# Patient Record
Sex: FEMALE | Race: ASIAN | NOT HISPANIC OR LATINO | URBAN - METROPOLITAN AREA
[De-identification: names, ages, dates, MRNs, and addresses within clinical notes are randomized per-mention and may not be internally consistent; named-entity substitution may affect disease eponyms.]

---

## 2016-10-19 RX ORDER — IPRATROPIUM/ALBUTEROL SULFATE 18-103MCG
3 AEROSOL WITH ADAPTER (GRAM) INHALATION
Qty: 0 | Refills: 0 | COMMUNITY
Start: 2016-10-19

## 2017-01-04 ENCOUNTER — INPATIENT (INPATIENT)
Facility: HOSPITAL | Age: 82
LOS: 4 days | Discharge: NURSING, REHAB OR LONG-TERM CARE | DRG: 208 | End: 2017-01-09
Attending: INTERNAL MEDICINE | Admitting: INTERNAL MEDICINE
Payer: MEDICARE

## 2017-01-04 VITALS — OXYGEN SATURATION: 98 % | HEART RATE: 58 BPM

## 2017-01-04 DIAGNOSIS — J96.00 ACUTE RESPIRATORY FAILURE, UNSPECIFIED WHETHER WITH HYPOXIA OR HYPERCAPNIA: ICD-10-CM

## 2017-01-04 DIAGNOSIS — Z95.2 PRESENCE OF PROSTHETIC HEART VALVE: Chronic | ICD-10-CM

## 2017-01-04 DIAGNOSIS — Z93.0 TRACHEOSTOMY STATUS: Chronic | ICD-10-CM

## 2017-01-04 DIAGNOSIS — Z96.641 PRESENCE OF RIGHT ARTIFICIAL HIP JOINT: Chronic | ICD-10-CM

## 2017-01-04 DIAGNOSIS — Z98.89 OTHER SPECIFIED POSTPROCEDURAL STATES: Chronic | ICD-10-CM

## 2017-01-04 LAB
ALBUMIN SERPL ELPH-MCNC: 3.1 G/DL — LOW (ref 3.3–5)
ALP SERPL-CCNC: 217 U/L — HIGH (ref 30–120)
ALT FLD-CCNC: 36 U/L DA — SIGNIFICANT CHANGE UP (ref 10–60)
ANION GAP SERPL CALC-SCNC: 10 MMOL/L — SIGNIFICANT CHANGE UP (ref 5–17)
APTT BLD: 31.9 SEC — SIGNIFICANT CHANGE UP (ref 27.5–37.4)
AST SERPL-CCNC: 58 U/L — HIGH (ref 10–40)
BASE EXCESS BLDA CALC-SCNC: 3.2 MMOL/L — HIGH (ref -2–2)
BASOPHILS # BLD AUTO: 0.1 K/UL — SIGNIFICANT CHANGE UP (ref 0–0.2)
BASOPHILS NFR BLD AUTO: 0.6 % — SIGNIFICANT CHANGE UP (ref 0–2)
BILIRUB SERPL-MCNC: 0.3 MG/DL — SIGNIFICANT CHANGE UP (ref 0.2–1.2)
BLOOD GAS SOURCE: SIGNIFICANT CHANGE UP
BUN SERPL-MCNC: 66 MG/DL — HIGH (ref 7–23)
CALCIUM SERPL-MCNC: 9 MG/DL — SIGNIFICANT CHANGE UP (ref 8.4–10.5)
CHLORIDE SERPL-SCNC: 104 MMOL/L — SIGNIFICANT CHANGE UP (ref 96–108)
CK MB BLD-MCNC: 4.4 % — HIGH (ref 0–3.5)
CK MB CFR SERPL CALC: 4.9 NG/ML — HIGH (ref 0–3.6)
CK SERPL-CCNC: 111 U/L — SIGNIFICANT CHANGE UP (ref 26–192)
CO2 SERPL-SCNC: 28 MMOL/L — SIGNIFICANT CHANGE UP (ref 22–31)
CREAT SERPL-MCNC: 1.94 MG/DL — HIGH (ref 0.5–1.3)
EOSINOPHIL # BLD AUTO: 0 K/UL — SIGNIFICANT CHANGE UP (ref 0–0.5)
EOSINOPHIL NFR BLD AUTO: 0 % — SIGNIFICANT CHANGE UP (ref 0–6)
GLUCOSE SERPL-MCNC: 182 MG/DL — HIGH (ref 70–99)
HCO3 BLDA-SCNC: 28 MMOL/L — SIGNIFICANT CHANGE UP (ref 21–29)
HCT VFR BLD CALC: 34.2 % — LOW (ref 34.5–45)
HGB BLD-MCNC: 10 G/DL — LOW (ref 11.5–15.5)
HOROWITZ INDEX BLDA+IHG-RTO: 60 — SIGNIFICANT CHANGE UP
INR BLD: 2.56 RATIO — HIGH (ref 0.88–1.16)
LYMPHOCYTES # BLD AUTO: 17 % — SIGNIFICANT CHANGE UP (ref 13–44)
LYMPHOCYTES # BLD AUTO: 2.2 K/UL — SIGNIFICANT CHANGE UP (ref 1–3.3)
MCHC RBC-ENTMCNC: 28.1 PG — SIGNIFICANT CHANGE UP (ref 27–34)
MCHC RBC-ENTMCNC: 29.3 GM/DL — LOW (ref 32–36)
MCV RBC AUTO: 95.9 FL — SIGNIFICANT CHANGE UP (ref 80–100)
MONOCYTES # BLD AUTO: 0.5 K/UL — SIGNIFICANT CHANGE UP (ref 0–0.9)
MONOCYTES NFR BLD AUTO: 3.7 % — SIGNIFICANT CHANGE UP (ref 2–14)
NEUTROPHILS # BLD AUTO: 10.2 K/UL — HIGH (ref 1.8–7.4)
NEUTROPHILS NFR BLD AUTO: 78.7 % — HIGH (ref 43–77)
PCO2 BLDA: 32 MMHG — SIGNIFICANT CHANGE UP (ref 32–46)
PH BLD: 7.52 — HIGH (ref 7.35–7.45)
PLATELET # BLD AUTO: 228 K/UL — SIGNIFICANT CHANGE UP (ref 150–400)
PO2 BLDA: 166 MMHG — HIGH (ref 74–108)
POTASSIUM SERPL-MCNC: 5.4 MMOL/L — HIGH (ref 3.5–5.3)
POTASSIUM SERPL-SCNC: 5.4 MMOL/L — HIGH (ref 3.5–5.3)
PROT SERPL-MCNC: 7.4 G/DL — SIGNIFICANT CHANGE UP (ref 6–8.3)
PROTHROM AB SERPL-ACNC: 29 SEC — HIGH (ref 10–13.1)
RBC # BLD: 3.57 M/UL — LOW (ref 3.8–5.2)
RBC # FLD: 16.2 % — HIGH (ref 10.3–14.5)
SAO2 % BLDA: 100 % — HIGH (ref 92–96)
SODIUM SERPL-SCNC: 142 MMOL/L — SIGNIFICANT CHANGE UP (ref 135–145)
TROPONIN I SERPL-MCNC: 0.11 NG/ML — HIGH (ref 0.02–0.06)
WBC # BLD: 13 K/UL — HIGH (ref 3.8–10.5)
WBC # FLD AUTO: 13 K/UL — HIGH (ref 3.8–10.5)

## 2017-01-04 PROCEDURE — 71010: CPT | Mod: 26

## 2017-01-04 PROCEDURE — 70450 CT HEAD/BRAIN W/O DYE: CPT | Mod: 26

## 2017-01-04 PROCEDURE — 99285 EMERGENCY DEPT VISIT HI MDM: CPT

## 2017-01-04 PROCEDURE — 93010 ELECTROCARDIOGRAM REPORT: CPT

## 2017-01-04 RX ORDER — TIOTROPIUM BROMIDE 18 UG/1
1 CAPSULE ORAL; RESPIRATORY (INHALATION) DAILY
Qty: 0 | Refills: 0 | Status: DISCONTINUED | OUTPATIENT
Start: 2017-01-04 | End: 2017-01-09

## 2017-01-04 RX ORDER — PANTOPRAZOLE SODIUM 20 MG/1
40 TABLET, DELAYED RELEASE ORAL DAILY
Qty: 0 | Refills: 0 | Status: DISCONTINUED | OUTPATIENT
Start: 2017-01-04 | End: 2017-01-07

## 2017-01-04 RX ORDER — POTASSIUM CHLORIDE 20 MEQ
10 PACKET (EA) ORAL
Qty: 0 | Refills: 0 | Status: DISCONTINUED | OUTPATIENT
Start: 2017-01-04 | End: 2017-01-09

## 2017-01-04 RX ORDER — ALBUTEROL 90 UG/1
2 AEROSOL, METERED ORAL EVERY 6 HOURS
Qty: 0 | Refills: 0 | Status: DISCONTINUED | OUTPATIENT
Start: 2017-01-04 | End: 2017-01-09

## 2017-01-04 RX ORDER — LEVOTHYROXINE SODIUM 125 MCG
25 TABLET ORAL DAILY
Qty: 0 | Refills: 0 | Status: DISCONTINUED | OUTPATIENT
Start: 2017-01-04 | End: 2017-01-09

## 2017-01-04 RX ORDER — DIGOXIN 250 MCG
0.12 TABLET ORAL DAILY
Qty: 0 | Refills: 0 | Status: DISCONTINUED | OUTPATIENT
Start: 2017-01-04 | End: 2017-01-09

## 2017-01-04 RX ORDER — SODIUM CHLORIDE 9 MG/ML
1000 INJECTION INTRAMUSCULAR; INTRAVENOUS; SUBCUTANEOUS
Qty: 0 | Refills: 0 | Status: DISCONTINUED | OUTPATIENT
Start: 2017-01-04 | End: 2017-01-05

## 2017-01-04 RX ORDER — GABAPENTIN 400 MG/1
200 CAPSULE ORAL
Qty: 0 | Refills: 0 | Status: DISCONTINUED | OUTPATIENT
Start: 2017-01-04 | End: 2017-01-09

## 2017-01-04 RX ORDER — FUROSEMIDE 40 MG
40 TABLET ORAL DAILY
Qty: 0 | Refills: 0 | Status: DISCONTINUED | OUTPATIENT
Start: 2017-01-04 | End: 2017-01-06

## 2017-01-05 DIAGNOSIS — R41.89 OTHER SYMPTOMS AND SIGNS INVOLVING COGNITIVE FUNCTIONS AND AWARENESS: ICD-10-CM

## 2017-01-05 DIAGNOSIS — J96.20 ACUTE AND CHRONIC RESPIRATORY FAILURE, UNSPECIFIED WHETHER WITH HYPOXIA OR HYPERCAPNIA: ICD-10-CM

## 2017-01-05 LAB
ANION GAP SERPL CALC-SCNC: 6 MMOL/L — SIGNIFICANT CHANGE UP (ref 5–17)
APTT BLD: 33.9 SEC — SIGNIFICANT CHANGE UP (ref 27.5–37.4)
BASE EXCESS BLDA CALC-SCNC: 5.8 MMOL/L — HIGH (ref -2–2)
BASOPHILS # BLD AUTO: 0 K/UL — SIGNIFICANT CHANGE UP (ref 0–0.2)
BASOPHILS NFR BLD AUTO: 0.3 % — SIGNIFICANT CHANGE UP (ref 0–2)
BLOOD GAS SOURCE: SIGNIFICANT CHANGE UP
BUN SERPL-MCNC: 61 MG/DL — HIGH (ref 7–23)
CALCIUM SERPL-MCNC: 8.7 MG/DL — SIGNIFICANT CHANGE UP (ref 8.4–10.5)
CHLORIDE SERPL-SCNC: 108 MMOL/L — SIGNIFICANT CHANGE UP (ref 96–108)
CK MB BLD-MCNC: 5.1 % — HIGH (ref 0–3.5)
CK MB CFR SERPL CALC: 3.2 NG/ML — SIGNIFICANT CHANGE UP (ref 0–3.6)
CK SERPL-CCNC: 63 U/L — SIGNIFICANT CHANGE UP (ref 26–192)
CO2 SERPL-SCNC: 30 MMOL/L — SIGNIFICANT CHANGE UP (ref 22–31)
CREAT SERPL-MCNC: 1.72 MG/DL — HIGH (ref 0.5–1.3)
EOSINOPHIL # BLD AUTO: 0 K/UL — SIGNIFICANT CHANGE UP (ref 0–0.5)
EOSINOPHIL NFR BLD AUTO: 0.3 % — SIGNIFICANT CHANGE UP (ref 0–6)
GLUCOSE SERPL-MCNC: 91 MG/DL — SIGNIFICANT CHANGE UP (ref 70–99)
HCO3 BLDA-SCNC: 30 MMOL/L — HIGH (ref 21–29)
HCT VFR BLD CALC: 27 % — LOW (ref 34.5–45)
HGB BLD-MCNC: 8.5 G/DL — LOW (ref 11.5–15.5)
HOROWITZ INDEX BLDA+IHG-RTO: 40 — SIGNIFICANT CHANGE UP
INR BLD: 3.1 RATIO — HIGH (ref 0.88–1.16)
LACTATE SERPL-SCNC: 1.6 MMOL/L — SIGNIFICANT CHANGE UP (ref 0.7–2)
LYMPHOCYTES # BLD AUTO: 0.7 K/UL — LOW (ref 1–3.3)
LYMPHOCYTES # BLD AUTO: 7.2 % — LOW (ref 13–44)
MAGNESIUM SERPL-MCNC: 2.2 MG/DL — SIGNIFICANT CHANGE UP (ref 1.6–2.6)
MCHC RBC-ENTMCNC: 28.3 PG — SIGNIFICANT CHANGE UP (ref 27–34)
MCHC RBC-ENTMCNC: 31.3 GM/DL — LOW (ref 32–36)
MCV RBC AUTO: 90.4 FL — SIGNIFICANT CHANGE UP (ref 80–100)
MONOCYTES # BLD AUTO: 0.7 K/UL — SIGNIFICANT CHANGE UP (ref 0–0.9)
MONOCYTES NFR BLD AUTO: 7.7 % — SIGNIFICANT CHANGE UP (ref 2–14)
NEUTROPHILS # BLD AUTO: 8.1 K/UL — HIGH (ref 1.8–7.4)
NEUTROPHILS NFR BLD AUTO: 84.4 % — HIGH (ref 43–77)
PCO2 BLDA: 30 MMHG — LOW (ref 32–46)
PH BLD: 7.58 — HIGH (ref 7.35–7.45)
PLATELET # BLD AUTO: 184 K/UL — SIGNIFICANT CHANGE UP (ref 150–400)
PO2 BLDA: 157 MMHG — HIGH (ref 74–108)
POTASSIUM SERPL-MCNC: 4 MMOL/L — SIGNIFICANT CHANGE UP (ref 3.5–5.3)
POTASSIUM SERPL-SCNC: 4 MMOL/L — SIGNIFICANT CHANGE UP (ref 3.5–5.3)
PROCALCITONIN SERPL-MCNC: 0.32 NG/ML — HIGH (ref 0–0.05)
PROTHROM AB SERPL-ACNC: 35.1 SEC — HIGH (ref 10–13.1)
RBC # BLD: 2.99 M/UL — LOW (ref 3.8–5.2)
RBC # FLD: 15.7 % — HIGH (ref 10.3–14.5)
SAO2 % BLDA: 100 % — HIGH (ref 92–96)
SODIUM SERPL-SCNC: 144 MMOL/L — SIGNIFICANT CHANGE UP (ref 135–145)
TROPONIN I SERPL-MCNC: 0.32 NG/ML — HIGH (ref 0.02–0.06)
WBC # BLD: 9.6 K/UL — SIGNIFICANT CHANGE UP (ref 3.8–10.5)
WBC # FLD AUTO: 9.6 K/UL — SIGNIFICANT CHANGE UP (ref 3.8–10.5)

## 2017-01-05 PROCEDURE — 93306 TTE W/DOPPLER COMPLETE: CPT | Mod: 26

## 2017-01-05 PROCEDURE — 99223 1ST HOSP IP/OBS HIGH 75: CPT | Mod: 25

## 2017-01-05 RX ORDER — ERYTHROPOIETIN 10000 [IU]/ML
10000 INJECTION, SOLUTION INTRAVENOUS; SUBCUTANEOUS
Qty: 0 | Refills: 0 | Status: DISCONTINUED | OUTPATIENT
Start: 2017-01-05 | End: 2017-01-09

## 2017-01-05 RX ORDER — VANCOMYCIN HCL 1 G
1000 VIAL (EA) INTRAVENOUS ONCE
Qty: 0 | Refills: 0 | Status: COMPLETED | OUTPATIENT
Start: 2017-01-05 | End: 2017-01-05

## 2017-01-05 RX ORDER — PIPERACILLIN AND TAZOBACTAM 4; .5 G/20ML; G/20ML
3.38 INJECTION, POWDER, LYOPHILIZED, FOR SOLUTION INTRAVENOUS ONCE
Qty: 0 | Refills: 0 | Status: COMPLETED | OUTPATIENT
Start: 2017-01-05 | End: 2017-01-05

## 2017-01-05 RX ORDER — ACETAMINOPHEN 500 MG
650 TABLET ORAL EVERY 6 HOURS
Qty: 0 | Refills: 0 | Status: DISCONTINUED | OUTPATIENT
Start: 2017-01-05 | End: 2017-01-09

## 2017-01-05 RX ORDER — SODIUM CHLORIDE 9 MG/ML
1000 INJECTION, SOLUTION INTRAVENOUS
Qty: 0 | Refills: 0 | Status: DISCONTINUED | OUTPATIENT
Start: 2017-01-05 | End: 2017-01-06

## 2017-01-05 RX ORDER — PIPERACILLIN AND TAZOBACTAM 4; .5 G/20ML; G/20ML
3.38 INJECTION, POWDER, LYOPHILIZED, FOR SOLUTION INTRAVENOUS EVERY 12 HOURS
Qty: 0 | Refills: 0 | Status: DISCONTINUED | OUTPATIENT
Start: 2017-01-05 | End: 2017-01-07

## 2017-01-05 RX ADMIN — Medication 0.12 MILLIGRAM(S): at 05:44

## 2017-01-05 RX ADMIN — GABAPENTIN 200 MILLIGRAM(S): 400 CAPSULE ORAL at 17:42

## 2017-01-05 RX ADMIN — PANTOPRAZOLE SODIUM 40 MILLIGRAM(S): 20 TABLET, DELAYED RELEASE ORAL at 12:56

## 2017-01-05 RX ADMIN — Medication 25 MICROGRAM(S): at 05:44

## 2017-01-05 RX ADMIN — Medication 10 MILLIEQUIVALENT(S): at 17:42

## 2017-01-05 RX ADMIN — GABAPENTIN 200 MILLIGRAM(S): 400 CAPSULE ORAL at 05:44

## 2017-01-05 RX ADMIN — Medication 250 MILLIGRAM(S): at 01:12

## 2017-01-05 RX ADMIN — Medication 40 MILLIGRAM(S): at 05:43

## 2017-01-05 RX ADMIN — ALBUTEROL 2 PUFF(S): 90 AEROSOL, METERED ORAL at 08:08

## 2017-01-05 RX ADMIN — Medication 650 MILLIGRAM(S): at 20:13

## 2017-01-05 RX ADMIN — SODIUM CHLORIDE 75 MILLILITER(S): 9 INJECTION INTRAMUSCULAR; INTRAVENOUS; SUBCUTANEOUS at 00:19

## 2017-01-05 RX ADMIN — Medication 650 MILLIGRAM(S): at 21:03

## 2017-01-05 RX ADMIN — PIPERACILLIN AND TAZOBACTAM 25 GRAM(S): 4; .5 INJECTION, POWDER, LYOPHILIZED, FOR SOLUTION INTRAVENOUS at 17:42

## 2017-01-05 RX ADMIN — PIPERACILLIN AND TAZOBACTAM 25 GRAM(S): 4; .5 INJECTION, POWDER, LYOPHILIZED, FOR SOLUTION INTRAVENOUS at 06:13

## 2017-01-05 RX ADMIN — SODIUM CHLORIDE 50 MILLILITER(S): 9 INJECTION, SOLUTION INTRAVENOUS at 10:26

## 2017-01-05 RX ADMIN — ALBUTEROL 2 PUFF(S): 90 AEROSOL, METERED ORAL at 00:31

## 2017-01-05 RX ADMIN — Medication 10 MILLIGRAM(S): at 05:44

## 2017-01-05 RX ADMIN — PIPERACILLIN AND TAZOBACTAM 200 GRAM(S): 4; .5 INJECTION, POWDER, LYOPHILIZED, FOR SOLUTION INTRAVENOUS at 00:41

## 2017-01-05 RX ADMIN — Medication 10 MILLIEQUIVALENT(S): at 05:43

## 2017-01-05 RX ADMIN — Medication 0.5 MILLIGRAM(S): at 00:33

## 2017-01-05 NOTE — H&P ADULT. - HISTORY OF PRESENT ILLNESS
This is an 82 YO A F transferred from SNF after she found unresponsive and her Trache cannula was out. After replacement of cannula and vent therapy patient returned to her base line.  No nausea or vomiting.

## 2017-01-06 LAB
ALBUMIN SERPL ELPH-MCNC: 2.8 G/DL — LOW (ref 3.3–5)
ALP SERPL-CCNC: 166 U/L — HIGH (ref 30–120)
ALT FLD-CCNC: 23 U/L DA — SIGNIFICANT CHANGE UP (ref 10–60)
ANION GAP SERPL CALC-SCNC: 4 MMOL/L — LOW (ref 5–17)
AST SERPL-CCNC: 30 U/L — SIGNIFICANT CHANGE UP (ref 10–40)
BILIRUB SERPL-MCNC: 0.4 MG/DL — SIGNIFICANT CHANGE UP (ref 0.2–1.2)
BUN SERPL-MCNC: 51 MG/DL — HIGH (ref 7–23)
CALCIUM SERPL-MCNC: 8.6 MG/DL — SIGNIFICANT CHANGE UP (ref 8.4–10.5)
CHLORIDE SERPL-SCNC: 107 MMOL/L — SIGNIFICANT CHANGE UP (ref 96–108)
CO2 SERPL-SCNC: 32 MMOL/L — HIGH (ref 22–31)
CREAT SERPL-MCNC: 1.81 MG/DL — HIGH (ref 0.5–1.3)
FERRITIN SERPL-MCNC: 353 NG/ML — HIGH (ref 15–150)
FOLATE SERPL-MCNC: >20 NG/ML — SIGNIFICANT CHANGE UP (ref 4.8–24.2)
GLUCOSE SERPL-MCNC: 90 MG/DL — SIGNIFICANT CHANGE UP (ref 70–99)
HCT VFR BLD CALC: 32.4 % — LOW (ref 34.5–45)
HGB BLD-MCNC: 9.7 G/DL — LOW (ref 11.5–15.5)
INR BLD: 3.01 RATIO — HIGH (ref 0.88–1.16)
IRON SATN MFR SERPL: 16 % — SIGNIFICANT CHANGE UP (ref 14–50)
IRON SATN MFR SERPL: 37 UG/DL — SIGNIFICANT CHANGE UP (ref 30–160)
MCHC RBC-ENTMCNC: 28.6 PG — SIGNIFICANT CHANGE UP (ref 27–34)
MCHC RBC-ENTMCNC: 30 GM/DL — LOW (ref 32–36)
MCV RBC AUTO: 95.6 FL — SIGNIFICANT CHANGE UP (ref 80–100)
PLATELET # BLD AUTO: 200 K/UL — SIGNIFICANT CHANGE UP (ref 150–400)
POTASSIUM SERPL-MCNC: 4.2 MMOL/L — SIGNIFICANT CHANGE UP (ref 3.5–5.3)
POTASSIUM SERPL-SCNC: 4.2 MMOL/L — SIGNIFICANT CHANGE UP (ref 3.5–5.3)
PROT SERPL-MCNC: 6.2 G/DL — SIGNIFICANT CHANGE UP (ref 6–8.3)
PROT SERPL-MCNC: 6.2 G/DL — SIGNIFICANT CHANGE UP (ref 6–8.3)
PROT SERPL-MCNC: 6.5 G/DL — SIGNIFICANT CHANGE UP (ref 6–8.3)
PROTHROM AB SERPL-ACNC: 34.1 SEC — HIGH (ref 10–13.1)
RBC # BLD: 3.14 M/UL — LOW (ref 3.8–5.2)
RBC # BLD: 3.39 M/UL — LOW (ref 3.8–5.2)
RBC # FLD: 16.6 % — HIGH (ref 10.3–14.5)
RETICS #: 68.5 K/UL — SIGNIFICANT CHANGE UP (ref 25–125)
RETICS/RBC NFR: 2.2 % — SIGNIFICANT CHANGE UP (ref 0.5–2.5)
SODIUM SERPL-SCNC: 143 MMOL/L — SIGNIFICANT CHANGE UP (ref 135–145)
TIBC SERPL-MCNC: 227 UG/DL — SIGNIFICANT CHANGE UP (ref 220–430)
UIBC SERPL-MCNC: 190 UG/DL — SIGNIFICANT CHANGE UP (ref 110–370)
VIT B12 SERPL-MCNC: 739 PG/ML — SIGNIFICANT CHANGE UP (ref 243–894)
WBC # BLD: 8 K/UL — SIGNIFICANT CHANGE UP (ref 3.8–10.5)
WBC # FLD AUTO: 8 K/UL — SIGNIFICANT CHANGE UP (ref 3.8–10.5)

## 2017-01-06 PROCEDURE — 76700 US EXAM ABDOM COMPLETE: CPT | Mod: 26

## 2017-01-06 PROCEDURE — 99232 SBSQ HOSP IP/OBS MODERATE 35: CPT

## 2017-01-06 PROCEDURE — 71010: CPT | Mod: 26

## 2017-01-06 RX ORDER — DILTIAZEM HCL 120 MG
120 CAPSULE, EXT RELEASE 24 HR ORAL DAILY
Qty: 0 | Refills: 0 | Status: DISCONTINUED | OUTPATIENT
Start: 2017-01-06 | End: 2017-01-09

## 2017-01-06 RX ORDER — FUROSEMIDE 40 MG
20 TABLET ORAL EVERY 12 HOURS
Qty: 0 | Refills: 0 | Status: DISCONTINUED | OUTPATIENT
Start: 2017-01-06 | End: 2017-01-08

## 2017-01-06 RX ADMIN — PANTOPRAZOLE SODIUM 40 MILLIGRAM(S): 20 TABLET, DELAYED RELEASE ORAL at 12:00

## 2017-01-06 RX ADMIN — Medication 650 MILLIGRAM(S): at 20:00

## 2017-01-06 RX ADMIN — TIOTROPIUM BROMIDE 1 CAPSULE(S): 18 CAPSULE ORAL; RESPIRATORY (INHALATION) at 06:42

## 2017-01-06 RX ADMIN — Medication 650 MILLIGRAM(S): at 02:01

## 2017-01-06 RX ADMIN — Medication 10 MILLIEQUIVALENT(S): at 17:29

## 2017-01-06 RX ADMIN — Medication 25 MICROGRAM(S): at 06:07

## 2017-01-06 RX ADMIN — Medication 650 MILLIGRAM(S): at 14:48

## 2017-01-06 RX ADMIN — Medication 10 MILLIEQUIVALENT(S): at 06:07

## 2017-01-06 RX ADMIN — PIPERACILLIN AND TAZOBACTAM 25 GRAM(S): 4; .5 INJECTION, POWDER, LYOPHILIZED, FOR SOLUTION INTRAVENOUS at 06:07

## 2017-01-06 RX ADMIN — GABAPENTIN 200 MILLIGRAM(S): 400 CAPSULE ORAL at 17:29

## 2017-01-06 RX ADMIN — Medication 10 MILLIGRAM(S): at 06:07

## 2017-01-06 RX ADMIN — Medication 20 MILLIGRAM(S): at 17:29

## 2017-01-06 RX ADMIN — Medication 650 MILLIGRAM(S): at 14:18

## 2017-01-06 RX ADMIN — SODIUM CHLORIDE 50 MILLILITER(S): 9 INJECTION, SOLUTION INTRAVENOUS at 06:09

## 2017-01-06 RX ADMIN — Medication 650 MILLIGRAM(S): at 06:21

## 2017-01-06 RX ADMIN — Medication 650 MILLIGRAM(S): at 07:01

## 2017-01-06 RX ADMIN — PIPERACILLIN AND TAZOBACTAM 25 GRAM(S): 4; .5 INJECTION, POWDER, LYOPHILIZED, FOR SOLUTION INTRAVENOUS at 17:30

## 2017-01-06 RX ADMIN — Medication 40 MILLIGRAM(S): at 06:07

## 2017-01-06 RX ADMIN — Medication 0.12 MILLIGRAM(S): at 06:07

## 2017-01-06 RX ADMIN — GABAPENTIN 200 MILLIGRAM(S): 400 CAPSULE ORAL at 06:07

## 2017-01-06 NOTE — SWALLOW BEDSIDE ASSESSMENT ADULT - SWALLOW EVAL: RECOMMENDED FEEDING/EATING TECHNIQUES
allow for swallow between intakes/crush medication (when feasible)/position upright (90 degrees)/small sips/bites/oral hygiene/maintain upright posture during/after eating for 30 mins

## 2017-01-06 NOTE — GOALS OF CARE CONVERSATION - PERSONAL ADVANCE DIRECTIVE - TREATMENT GUIDELINE COMMENT
Dtr states she has a MOLST none found on paperwork sent from [facility].Consented to new form.

## 2017-01-06 NOTE — SWALLOW BEDSIDE ASSESSMENT ADULT - COMMENTS
Pt alert, trach.  Pt with hx of dysphagia.  Pt requesting diet change.  Pt with oropharyngeal dysphagia characterized by prolonged AP transport, labored mastication, swallow delay.  No overt s/s of aspiration, no green dye return upon suctioning by RN.

## 2017-01-06 NOTE — SWALLOW BEDSIDE ASSESSMENT ADULT - ASR SWALLOW ASPIRATION MONITOR
change of breathing pattern/upper respiratory infection/throat clearing/position upright (90Y)/cough/fever/oral hygiene/gurgly voice/pneumonia

## 2017-01-06 NOTE — PHYSICAL THERAPY INITIAL EVALUATION ADULT - PERTINENT HX OF CURRENT PROBLEM, REHAB EVAL
This is an 82 YO A F transferred from SNF after she found unresponsive and her Trache cannula was out. After replacement of cannula and vent therapy patient returned to her base line,  Pt w/ acute respiratory failure

## 2017-01-06 NOTE — DIETITIAN INITIAL EVALUATION ADULT. - NS AS NUTRI INTERV MEALS SNACK
Texture-modified diet/General/healthful diet/Fluid - modified diet/pureed diet c nectar flds for now; slp consult as feasible

## 2017-01-06 NOTE — DIETITIAN INITIAL EVALUATION ADULT. - OTHER INFO
83F adm from SNF for unresponsiveness/acute resp failure. Pt on trach at present and pta. Pt on dysphagia #1, pureed diet c nectar thick liquids. Pt received USG of abd today- noted to have cholelithiasis, L PE. Per RN, pt tolerate diet well but appears not to like thickened liquids. Pt was on [facility] soft diet with thin liquids at [facility]. SLP eval pending as pt c hx dysphagia/trach. NKFA. Skin: sacrum stage I.

## 2017-01-07 LAB
% ALBUMIN: 52.3 % — SIGNIFICANT CHANGE UP
% ALPHA 1: 6.5 % — SIGNIFICANT CHANGE UP
% ALPHA 2: 11.9 % — SIGNIFICANT CHANGE UP
% BETA: 12.5 % — SIGNIFICANT CHANGE UP
% GAMMA: 16.8 % — SIGNIFICANT CHANGE UP
ALBUMIN SERPL ELPH-MCNC: 3.2 G/DL — LOW (ref 3.6–5.5)
ALBUMIN/GLOB SERPL ELPH: 1.1 RATIO — SIGNIFICANT CHANGE UP
ALPHA1 GLOB SERPL ELPH-MCNC: 0.4 G/DL — SIGNIFICANT CHANGE UP (ref 0.1–0.4)
ALPHA2 GLOB SERPL ELPH-MCNC: 0.7 G/DL — SIGNIFICANT CHANGE UP (ref 0.5–1)
ANION GAP SERPL CALC-SCNC: 3 MMOL/L — LOW (ref 5–17)
B-GLOBULIN SERPL ELPH-MCNC: 0.8 G/DL — SIGNIFICANT CHANGE UP (ref 0.5–1)
BUN SERPL-MCNC: 52 MG/DL — HIGH (ref 7–23)
CALCIUM SERPL-MCNC: 8.6 MG/DL — SIGNIFICANT CHANGE UP (ref 8.4–10.5)
CHLORIDE SERPL-SCNC: 107 MMOL/L — SIGNIFICANT CHANGE UP (ref 96–108)
CO2 SERPL-SCNC: 34 MMOL/L — HIGH (ref 22–31)
CREAT SERPL-MCNC: 1.81 MG/DL — HIGH (ref 0.5–1.3)
GAMMA GLOBULIN: 1 G/DL — SIGNIFICANT CHANGE UP (ref 0.6–1.6)
GLUCOSE SERPL-MCNC: 99 MG/DL — SIGNIFICANT CHANGE UP (ref 70–99)
HCT VFR BLD CALC: 28.8 % — LOW (ref 34.5–45)
HGB BLD-MCNC: 8.6 G/DL — LOW (ref 11.5–15.5)
INR BLD: 2.98 RATIO — HIGH (ref 0.88–1.16)
MCHC RBC-ENTMCNC: 28 PG — SIGNIFICANT CHANGE UP (ref 27–34)
MCHC RBC-ENTMCNC: 30 GM/DL — LOW (ref 32–36)
MCV RBC AUTO: 93.2 FL — SIGNIFICANT CHANGE UP (ref 80–100)
PLATELET # BLD AUTO: 185 K/UL — SIGNIFICANT CHANGE UP (ref 150–400)
POTASSIUM SERPL-MCNC: 3.8 MMOL/L — SIGNIFICANT CHANGE UP (ref 3.5–5.3)
POTASSIUM SERPL-SCNC: 3.8 MMOL/L — SIGNIFICANT CHANGE UP (ref 3.5–5.3)
PROT PATTERN SERPL ELPH-IMP: SIGNIFICANT CHANGE UP
PROTHROM AB SERPL-ACNC: 33.7 SEC — HIGH (ref 10–13.1)
RBC # BLD: 3.09 M/UL — LOW (ref 3.8–5.2)
RBC # FLD: 16.6 % — HIGH (ref 10.3–14.5)
SODIUM SERPL-SCNC: 144 MMOL/L — SIGNIFICANT CHANGE UP (ref 135–145)
WBC # BLD: 6.9 K/UL — SIGNIFICANT CHANGE UP (ref 3.8–10.5)
WBC # FLD AUTO: 6.9 K/UL — SIGNIFICANT CHANGE UP (ref 3.8–10.5)

## 2017-01-07 PROCEDURE — 71010: CPT | Mod: 26

## 2017-01-07 PROCEDURE — 99232 SBSQ HOSP IP/OBS MODERATE 35: CPT

## 2017-01-07 RX ORDER — WARFARIN SODIUM 2.5 MG/1
1 TABLET ORAL ONCE
Qty: 0 | Refills: 0 | Status: COMPLETED | OUTPATIENT
Start: 2017-01-07 | End: 2017-01-07

## 2017-01-07 RX ORDER — PANTOPRAZOLE SODIUM 20 MG/1
40 TABLET, DELAYED RELEASE ORAL
Qty: 0 | Refills: 0 | Status: DISCONTINUED | OUTPATIENT
Start: 2017-01-07 | End: 2017-01-09

## 2017-01-07 RX ADMIN — ALBUTEROL 2 PUFF(S): 90 AEROSOL, METERED ORAL at 14:08

## 2017-01-07 RX ADMIN — Medication 25 MICROGRAM(S): at 06:12

## 2017-01-07 RX ADMIN — PANTOPRAZOLE SODIUM 40 MILLIGRAM(S): 20 TABLET, DELAYED RELEASE ORAL at 11:40

## 2017-01-07 RX ADMIN — Medication 650 MILLIGRAM(S): at 17:56

## 2017-01-07 RX ADMIN — ERYTHROPOIETIN 10000 UNIT(S): 10000 INJECTION, SOLUTION INTRAVENOUS; SUBCUTANEOUS at 17:26

## 2017-01-07 RX ADMIN — GABAPENTIN 200 MILLIGRAM(S): 400 CAPSULE ORAL at 17:25

## 2017-01-07 RX ADMIN — Medication 0.12 MILLIGRAM(S): at 06:10

## 2017-01-07 RX ADMIN — Medication 10 MILLIGRAM(S): at 06:14

## 2017-01-07 RX ADMIN — Medication 20 MILLIGRAM(S): at 06:11

## 2017-01-07 RX ADMIN — GABAPENTIN 200 MILLIGRAM(S): 400 CAPSULE ORAL at 06:11

## 2017-01-07 RX ADMIN — Medication 120 MILLIGRAM(S): at 06:11

## 2017-01-07 RX ADMIN — Medication 10 MILLIEQUIVALENT(S): at 06:14

## 2017-01-07 RX ADMIN — Medication 650 MILLIGRAM(S): at 17:29

## 2017-01-07 RX ADMIN — PIPERACILLIN AND TAZOBACTAM 25 GRAM(S): 4; .5 INJECTION, POWDER, LYOPHILIZED, FOR SOLUTION INTRAVENOUS at 06:13

## 2017-01-07 RX ADMIN — ALBUTEROL 2 PUFF(S): 90 AEROSOL, METERED ORAL at 20:23

## 2017-01-07 RX ADMIN — ALBUTEROL 2 PUFF(S): 90 AEROSOL, METERED ORAL at 07:33

## 2017-01-07 RX ADMIN — Medication 20 MILLIGRAM(S): at 17:25

## 2017-01-07 RX ADMIN — TIOTROPIUM BROMIDE 1 CAPSULE(S): 18 CAPSULE ORAL; RESPIRATORY (INHALATION) at 06:30

## 2017-01-07 RX ADMIN — WARFARIN SODIUM 1 MILLIGRAM(S): 2.5 TABLET ORAL at 21:39

## 2017-01-07 RX ADMIN — ALBUTEROL 2 PUFF(S): 90 AEROSOL, METERED ORAL at 13:00

## 2017-01-07 RX ADMIN — Medication 10 MILLIEQUIVALENT(S): at 17:38

## 2017-01-08 LAB
ANION GAP SERPL CALC-SCNC: 3 MMOL/L — LOW (ref 5–17)
BUN SERPL-MCNC: 47 MG/DL — HIGH (ref 7–23)
CALCIUM SERPL-MCNC: 8.8 MG/DL — SIGNIFICANT CHANGE UP (ref 8.4–10.5)
CHLORIDE SERPL-SCNC: 107 MMOL/L — SIGNIFICANT CHANGE UP (ref 96–108)
CO2 SERPL-SCNC: 35 MMOL/L — HIGH (ref 22–31)
CREAT SERPL-MCNC: 1.95 MG/DL — HIGH (ref 0.5–1.3)
GLUCOSE SERPL-MCNC: 97 MG/DL — SIGNIFICANT CHANGE UP (ref 70–99)
HCT VFR BLD CALC: 30.3 % — LOW (ref 34.5–45)
HGB BLD-MCNC: 9 G/DL — LOW (ref 11.5–15.5)
INR BLD: 2.58 RATIO — HIGH (ref 0.88–1.16)
MAGNESIUM SERPL-MCNC: 2.3 MG/DL — SIGNIFICANT CHANGE UP (ref 1.6–2.6)
MCHC RBC-ENTMCNC: 28.3 PG — SIGNIFICANT CHANGE UP (ref 27–34)
MCHC RBC-ENTMCNC: 29.8 GM/DL — LOW (ref 32–36)
MCV RBC AUTO: 95.2 FL — SIGNIFICANT CHANGE UP (ref 80–100)
PLATELET # BLD AUTO: 170 K/UL — SIGNIFICANT CHANGE UP (ref 150–400)
POTASSIUM SERPL-MCNC: 3.7 MMOL/L — SIGNIFICANT CHANGE UP (ref 3.5–5.3)
POTASSIUM SERPL-SCNC: 3.7 MMOL/L — SIGNIFICANT CHANGE UP (ref 3.5–5.3)
PROTHROM AB SERPL-ACNC: 29.2 SEC — HIGH (ref 10–13.1)
RBC # BLD: 3.18 M/UL — LOW (ref 3.8–5.2)
RBC # FLD: 15.8 % — HIGH (ref 10.3–14.5)
SODIUM SERPL-SCNC: 145 MMOL/L — SIGNIFICANT CHANGE UP (ref 135–145)
WBC # BLD: 6.2 K/UL — SIGNIFICANT CHANGE UP (ref 3.8–10.5)
WBC # FLD AUTO: 6.2 K/UL — SIGNIFICANT CHANGE UP (ref 3.8–10.5)

## 2017-01-08 RX ORDER — FUROSEMIDE 40 MG
40 TABLET ORAL DAILY
Qty: 0 | Refills: 0 | Status: DISCONTINUED | OUTPATIENT
Start: 2017-01-09 | End: 2017-01-09

## 2017-01-08 RX ORDER — ERYTHROPOIETIN 10000 [IU]/ML
10000 INJECTION, SOLUTION INTRAVENOUS; SUBCUTANEOUS
Qty: 0 | Refills: 0 | COMMUNITY
Start: 2017-01-08

## 2017-01-08 RX ORDER — WARFARIN SODIUM 2.5 MG/1
1 TABLET ORAL ONCE
Qty: 0 | Refills: 0 | Status: COMPLETED | OUTPATIENT
Start: 2017-01-08 | End: 2017-01-08

## 2017-01-08 RX ADMIN — Medication 650 MILLIGRAM(S): at 06:18

## 2017-01-08 RX ADMIN — ALBUTEROL 2 PUFF(S): 90 AEROSOL, METERED ORAL at 06:52

## 2017-01-08 RX ADMIN — Medication 20 MILLIGRAM(S): at 06:13

## 2017-01-08 RX ADMIN — Medication 10 MILLIGRAM(S): at 06:14

## 2017-01-08 RX ADMIN — ALBUTEROL 2 PUFF(S): 90 AEROSOL, METERED ORAL at 13:08

## 2017-01-08 RX ADMIN — TIOTROPIUM BROMIDE 1 CAPSULE(S): 18 CAPSULE ORAL; RESPIRATORY (INHALATION) at 06:51

## 2017-01-08 RX ADMIN — WARFARIN SODIUM 1 MILLIGRAM(S): 2.5 TABLET ORAL at 21:22

## 2017-01-08 RX ADMIN — Medication 25 MICROGRAM(S): at 06:14

## 2017-01-08 RX ADMIN — Medication 0.12 MILLIGRAM(S): at 06:14

## 2017-01-08 RX ADMIN — ALBUTEROL 2 PUFF(S): 90 AEROSOL, METERED ORAL at 01:32

## 2017-01-08 RX ADMIN — PANTOPRAZOLE SODIUM 40 MILLIGRAM(S): 20 TABLET, DELAYED RELEASE ORAL at 06:14

## 2017-01-08 RX ADMIN — Medication 10 MILLIEQUIVALENT(S): at 06:15

## 2017-01-08 RX ADMIN — GABAPENTIN 200 MILLIGRAM(S): 400 CAPSULE ORAL at 17:22

## 2017-01-08 RX ADMIN — Medication 10 MILLIEQUIVALENT(S): at 17:22

## 2017-01-08 RX ADMIN — ALBUTEROL 2 PUFF(S): 90 AEROSOL, METERED ORAL at 01:50

## 2017-01-08 RX ADMIN — ALBUTEROL 2 PUFF(S): 90 AEROSOL, METERED ORAL at 19:50

## 2017-01-08 RX ADMIN — GABAPENTIN 200 MILLIGRAM(S): 400 CAPSULE ORAL at 06:14

## 2017-01-08 RX ADMIN — Medication 650 MILLIGRAM(S): at 06:51

## 2017-01-08 RX ADMIN — Medication 120 MILLIGRAM(S): at 06:14

## 2017-01-08 NOTE — DISCHARGE NOTE ADULT - PATIENT PORTAL LINK FT
“You can access the FollowHealth Patient Portal, offered by [facility], by registering with the following website: [email or web address]”

## 2017-01-08 NOTE — DISCHARGE NOTE ADULT - CARE PROVIDER_API CALL
Radha Toledo (BRADY), Internal Medicine  [address] [place] [address]  Phone: [phone]  Fax: [phone]

## 2017-01-08 NOTE — DISCHARGE NOTE ADULT - SECONDARY DIAGNOSIS.
Transient alteration of awareness Chronic obstructive pulmonary disease, unspecified COPD type Acute on chronic diastolic congestive heart failure Renal failure (ARF), acute on chronic Chronic atrial fibrillation

## 2017-01-08 NOTE — DISCHARGE NOTE ADULT - NS AS ACTIVITY OBS
No Heavy lifting/straining/Showering allowed/Walking-Indoors allowed/Do not make important decisions/Walking-Outdoors allowed/Bathing allowed/Do not drive or operate machinery

## 2017-01-08 NOTE — DISCHARGE NOTE ADULT - NS AS DC VTE INSTRUCTION
Coumadin/Warfarin - Dietary Advice.../Coumadin/Warfarin - Follow-up monitoring.../Coumadin/Warfarin - Compliance... Coumadin/Warfarin - Potential for adverse drug reactions and interactions/Coumadin/Warfarin - Dietary Advice.../Coumadin/Warfarin - Compliance.../Coumadin/Warfarin - Follow-up monitoring...

## 2017-01-08 NOTE — DISCHARGE NOTE ADULT - HOSPITAL COURSE
83-year-old female who was first brought into the emergency room secondary to change in mental status and hypoxic respiratory failure.  Patient had her tracheostomy tube dislodged at the nursing home.  The patient was placed on ventilator initially.  She had acute on chronic respiratory failure with hypoxemia.  The patient's mental status improved dramatically.  She was weaned off the ventilator.  The patient has been tolerating trach collar without any problems.  Patient was also noted to have acute on chronic diastolic heart failure.  She is being treated with diuretics.  The patient also has possible acute kidney injury superimposed on chronic kidney disease stage III.  Patient is slowly improving.      She is being discharged back to [facility] for long-term care.

## 2017-01-08 NOTE — DISCHARGE NOTE ADULT - PLAN OF CARE
breath better Mechanical soft diet with thin liquid  Continue trach collar at NH  Increase activity as tolerated.   Monitor CBC, BMP and PT/INR  Follow up with Dr. Toledo at NH

## 2017-01-08 NOTE — DISCHARGE NOTE ADULT - CARE PLAN
Principal Discharge DX:	Acute on chronic respiratory failure, unspecified whether with hypoxia or hypercapnia  Goal:	breath better  Instructions for follow-up, activity and diet:	Mechanical soft diet with thin liquid  Continue trach collar at NH  Increase activity as tolerated.   Monitor CBC, BMP and PT/INR  Follow up with Dr. Toledo at NH  Secondary Diagnosis:	Transient alteration of awareness  Secondary Diagnosis:	Chronic obstructive pulmonary disease, unspecified COPD type  Secondary Diagnosis:	Acute on chronic diastolic congestive heart failure  Secondary Diagnosis:	Renal failure (ARF), acute on chronic  Secondary Diagnosis:	Chronic atrial fibrillation

## 2017-01-09 VITALS — HEART RATE: 61 BPM | OXYGEN SATURATION: 99 % | RESPIRATION RATE: 17 BRPM

## 2017-01-09 LAB
ANION GAP SERPL CALC-SCNC: 4 MMOL/L — LOW (ref 5–17)
BUN SERPL-MCNC: 51 MG/DL — HIGH (ref 7–23)
CALCIUM SERPL-MCNC: 8.7 MG/DL — SIGNIFICANT CHANGE UP (ref 8.4–10.5)
CHLORIDE SERPL-SCNC: 105 MMOL/L — SIGNIFICANT CHANGE UP (ref 96–108)
CO2 SERPL-SCNC: 38 MMOL/L — HIGH (ref 22–31)
CREAT SERPL-MCNC: 1.75 MG/DL — HIGH (ref 0.5–1.3)
GLUCOSE SERPL-MCNC: 101 MG/DL — HIGH (ref 70–99)
HCT VFR BLD CALC: 30.2 % — LOW (ref 34.5–45)
HGB BLD-MCNC: 9.2 G/DL — LOW (ref 11.5–15.5)
MCHC RBC-ENTMCNC: 28.4 PG — SIGNIFICANT CHANGE UP (ref 27–34)
MCHC RBC-ENTMCNC: 30.3 GM/DL — LOW (ref 32–36)
MCV RBC AUTO: 93.6 FL — SIGNIFICANT CHANGE UP (ref 80–100)
PLATELET # BLD AUTO: 173 K/UL — SIGNIFICANT CHANGE UP (ref 150–400)
POTASSIUM SERPL-MCNC: 3.9 MMOL/L — SIGNIFICANT CHANGE UP (ref 3.5–5.3)
POTASSIUM SERPL-SCNC: 3.9 MMOL/L — SIGNIFICANT CHANGE UP (ref 3.5–5.3)
RBC # BLD: 3.23 M/UL — LOW (ref 3.8–5.2)
RBC # FLD: 16.2 % — HIGH (ref 10.3–14.5)
SODIUM SERPL-SCNC: 147 MMOL/L — HIGH (ref 135–145)
WBC # BLD: 6.2 K/UL — SIGNIFICANT CHANGE UP (ref 3.8–10.5)
WBC # FLD AUTO: 6.2 K/UL — SIGNIFICANT CHANGE UP (ref 3.8–10.5)

## 2017-01-09 PROCEDURE — 71010: CPT | Mod: 26

## 2017-01-09 PROCEDURE — 99232 SBSQ HOSP IP/OBS MODERATE 35: CPT

## 2017-01-09 RX ADMIN — Medication 650 MILLIGRAM(S): at 08:57

## 2017-01-09 RX ADMIN — Medication 120 MILLIGRAM(S): at 06:01

## 2017-01-09 RX ADMIN — ALBUTEROL 2 PUFF(S): 90 AEROSOL, METERED ORAL at 06:31

## 2017-01-09 RX ADMIN — PANTOPRAZOLE SODIUM 40 MILLIGRAM(S): 20 TABLET, DELAYED RELEASE ORAL at 06:01

## 2017-01-09 RX ADMIN — Medication 0.12 MILLIGRAM(S): at 06:01

## 2017-01-09 RX ADMIN — ALBUTEROL 2 PUFF(S): 90 AEROSOL, METERED ORAL at 00:54

## 2017-01-09 RX ADMIN — Medication 10 MILLIGRAM(S): at 06:01

## 2017-01-09 RX ADMIN — Medication 650 MILLIGRAM(S): at 10:00

## 2017-01-09 RX ADMIN — GABAPENTIN 200 MILLIGRAM(S): 400 CAPSULE ORAL at 06:01

## 2017-01-09 RX ADMIN — Medication 40 MILLIGRAM(S): at 06:02

## 2017-01-09 RX ADMIN — Medication 10 MILLIEQUIVALENT(S): at 06:01

## 2017-01-09 RX ADMIN — Medication 25 MICROGRAM(S): at 06:01

## 2017-01-09 RX ADMIN — TIOTROPIUM BROMIDE 1 CAPSULE(S): 18 CAPSULE ORAL; RESPIRATORY (INHALATION) at 06:31

## 2017-01-09 RX ADMIN — ALBUTEROL 2 PUFF(S): 90 AEROSOL, METERED ORAL at 12:58

## 2017-01-20 ENCOUNTER — EMERGENCY (EMERGENCY)
Facility: HOSPITAL | Age: 82
LOS: 1 days | Discharge: HOME | End: 2017-01-20
Attending: EMERGENCY MEDICINE | Admitting: EMERGENCY MEDICINE
Payer: MEDICARE

## 2017-01-20 VITALS
HEART RATE: 69 BPM | SYSTOLIC BLOOD PRESSURE: 122 MMHG | RESPIRATION RATE: 18 BRPM | OXYGEN SATURATION: 96 % | DIASTOLIC BLOOD PRESSURE: 36 MMHG

## 2017-01-20 VITALS
RESPIRATION RATE: 18 BRPM | DIASTOLIC BLOOD PRESSURE: 34 MMHG | SYSTOLIC BLOOD PRESSURE: 131 MMHG | OXYGEN SATURATION: 98 % | HEART RATE: 68 BPM

## 2017-01-20 DIAGNOSIS — Z96.641 PRESENCE OF RIGHT ARTIFICIAL HIP JOINT: ICD-10-CM

## 2017-01-20 DIAGNOSIS — F32.9 MAJOR DEPRESSIVE DISORDER, SINGLE EPISODE, UNSPECIFIED: ICD-10-CM

## 2017-01-20 DIAGNOSIS — Z79.52 LONG TERM (CURRENT) USE OF SYSTEMIC STEROIDS: ICD-10-CM

## 2017-01-20 DIAGNOSIS — Z93.1 GASTROSTOMY STATUS: ICD-10-CM

## 2017-01-20 DIAGNOSIS — Z95.4 PRESENCE OF OTHER HEART-VALVE REPLACEMENT: ICD-10-CM

## 2017-01-20 DIAGNOSIS — I48.91 UNSPECIFIED ATRIAL FIBRILLATION: ICD-10-CM

## 2017-01-20 DIAGNOSIS — D64.9 ANEMIA, UNSPECIFIED: ICD-10-CM

## 2017-01-20 DIAGNOSIS — Z79.82 LONG TERM (CURRENT) USE OF ASPIRIN: ICD-10-CM

## 2017-01-20 DIAGNOSIS — Z95.0 PRESENCE OF CARDIAC PACEMAKER: ICD-10-CM

## 2017-01-20 DIAGNOSIS — Z96.641 PRESENCE OF RIGHT ARTIFICIAL HIP JOINT: Chronic | ICD-10-CM

## 2017-01-20 DIAGNOSIS — Z93.0 TRACHEOSTOMY STATUS: ICD-10-CM

## 2017-01-20 DIAGNOSIS — Z85.42 PERSONAL HISTORY OF MALIGNANT NEOPLASM OF OTHER PARTS OF UTERUS: ICD-10-CM

## 2017-01-20 DIAGNOSIS — Z79.01 LONG TERM (CURRENT) USE OF ANTICOAGULANTS: ICD-10-CM

## 2017-01-20 DIAGNOSIS — Z79.2 LONG TERM (CURRENT) USE OF ANTIBIOTICS: ICD-10-CM

## 2017-01-20 DIAGNOSIS — Z87.01 PERSONAL HISTORY OF PNEUMONIA (RECURRENT): ICD-10-CM

## 2017-01-20 DIAGNOSIS — I10 ESSENTIAL (PRIMARY) HYPERTENSION: ICD-10-CM

## 2017-01-20 DIAGNOSIS — Z93.0 TRACHEOSTOMY STATUS: Chronic | ICD-10-CM

## 2017-01-20 DIAGNOSIS — R06.02 SHORTNESS OF BREATH: ICD-10-CM

## 2017-01-20 DIAGNOSIS — E03.9 HYPOTHYROIDISM, UNSPECIFIED: ICD-10-CM

## 2017-01-20 DIAGNOSIS — I50.9 HEART FAILURE, UNSPECIFIED: ICD-10-CM

## 2017-01-20 DIAGNOSIS — Z98.89 OTHER SPECIFIED POSTPROCEDURAL STATES: Chronic | ICD-10-CM

## 2017-01-20 DIAGNOSIS — Z66 DO NOT RESUSCITATE: ICD-10-CM

## 2017-01-20 DIAGNOSIS — Z95.2 PRESENCE OF PROSTHETIC HEART VALVE: Chronic | ICD-10-CM

## 2017-01-20 DIAGNOSIS — Z86.73 PERSONAL HISTORY OF TRANSIENT ISCHEMIC ATTACK (TIA), AND CEREBRAL INFARCTION WITHOUT RESIDUAL DEFICITS: ICD-10-CM

## 2017-01-20 DIAGNOSIS — K21.9 GASTRO-ESOPHAGEAL REFLUX DISEASE WITHOUT ESOPHAGITIS: ICD-10-CM

## 2017-01-20 LAB
ALBUMIN SERPL ELPH-MCNC: 3 G/DL — LOW (ref 3.3–5)
ALP SERPL-CCNC: 163 U/L — HIGH (ref 40–120)
ALT FLD-CCNC: 20 U/L — SIGNIFICANT CHANGE UP (ref 12–78)
ANION GAP SERPL CALC-SCNC: 7 MMOL/L — SIGNIFICANT CHANGE UP (ref 5–17)
APTT BLD: 35.7 SEC — SIGNIFICANT CHANGE UP (ref 27.5–37.4)
AST SERPL-CCNC: 31 U/L — SIGNIFICANT CHANGE UP (ref 15–37)
BASOPHILS # BLD AUTO: 0.1 K/UL — SIGNIFICANT CHANGE UP (ref 0–0.2)
BASOPHILS NFR BLD AUTO: 0.6 % — SIGNIFICANT CHANGE UP (ref 0–2)
BILIRUB SERPL-MCNC: 0.4 MG/DL — SIGNIFICANT CHANGE UP (ref 0.2–1.2)
BUN SERPL-MCNC: 41 MG/DL — HIGH (ref 7–23)
CALCIUM SERPL-MCNC: 8.1 MG/DL — LOW (ref 8.5–10.1)
CHLORIDE SERPL-SCNC: 102 MMOL/L — SIGNIFICANT CHANGE UP (ref 96–108)
CO2 SERPL-SCNC: 35 MMOL/L — HIGH (ref 22–31)
CREAT SERPL-MCNC: 1.7 MG/DL — HIGH (ref 0.5–1.3)
EOSINOPHIL # BLD AUTO: 0.2 K/UL — SIGNIFICANT CHANGE UP (ref 0–0.5)
EOSINOPHIL NFR BLD AUTO: 1.5 % — SIGNIFICANT CHANGE UP (ref 0–6)
GLUCOSE SERPL-MCNC: 146 MG/DL — HIGH (ref 70–99)
HCT VFR BLD CALC: 32.7 % — LOW (ref 34.5–45)
HGB BLD-MCNC: 9.6 G/DL — LOW (ref 11.5–15.5)
INR BLD: 2.94 RATIO — HIGH (ref 0.88–1.16)
LYMPHOCYTES # BLD AUTO: 2.7 K/UL — SIGNIFICANT CHANGE UP (ref 1–3.3)
LYMPHOCYTES # BLD AUTO: 26.1 % — SIGNIFICANT CHANGE UP (ref 13–44)
MCHC RBC-ENTMCNC: 28.5 PG — SIGNIFICANT CHANGE UP (ref 27–34)
MCHC RBC-ENTMCNC: 29.2 GM/DL — LOW (ref 32–36)
MCV RBC AUTO: 97.6 FL — SIGNIFICANT CHANGE UP (ref 80–100)
MONOCYTES # BLD AUTO: 0.7 K/UL — SIGNIFICANT CHANGE UP (ref 0–0.9)
MONOCYTES NFR BLD AUTO: 6.3 % — SIGNIFICANT CHANGE UP (ref 1–9)
NEUTROPHILS # BLD AUTO: 6.9 K/UL — SIGNIFICANT CHANGE UP (ref 1.8–7.4)
NEUTROPHILS NFR BLD AUTO: 65.6 % — SIGNIFICANT CHANGE UP (ref 43–77)
NT-PROBNP SERPL-SCNC: HIGH PG/ML (ref 0–450)
PLATELET # BLD AUTO: 222 K/UL — SIGNIFICANT CHANGE UP (ref 150–400)
POTASSIUM SERPL-MCNC: 4.3 MMOL/L — SIGNIFICANT CHANGE UP (ref 3.5–5.3)
POTASSIUM SERPL-SCNC: 4.3 MMOL/L — SIGNIFICANT CHANGE UP (ref 3.5–5.3)
PROT SERPL-MCNC: 6.5 G/DL — SIGNIFICANT CHANGE UP (ref 6–8.3)
PROTHROM AB SERPL-ACNC: 33 SEC — HIGH (ref 10–13.1)
RBC # BLD: 3.35 M/UL — LOW (ref 3.8–5.2)
RBC # FLD: 17.2 % — HIGH (ref 10.3–14.5)
SODIUM SERPL-SCNC: 144 MMOL/L — SIGNIFICANT CHANGE UP (ref 135–145)
WBC # BLD: 10.5 K/UL — SIGNIFICANT CHANGE UP (ref 3.8–10.5)
WBC # FLD AUTO: 10.5 K/UL — SIGNIFICANT CHANGE UP (ref 3.8–10.5)

## 2017-01-20 PROCEDURE — 93005 ELECTROCARDIOGRAM TRACING: CPT

## 2017-01-20 PROCEDURE — 80053 COMPREHEN METABOLIC PANEL: CPT

## 2017-01-20 PROCEDURE — 85027 COMPLETE CBC AUTOMATED: CPT

## 2017-01-20 PROCEDURE — 71045 X-RAY EXAM CHEST 1 VIEW: CPT

## 2017-01-20 PROCEDURE — 83880 ASSAY OF NATRIURETIC PEPTIDE: CPT

## 2017-01-20 PROCEDURE — 99285 EMERGENCY DEPT VISIT HI MDM: CPT

## 2017-01-20 PROCEDURE — 71010: CPT | Mod: 26

## 2017-01-20 PROCEDURE — 99284 EMERGENCY DEPT VISIT MOD MDM: CPT | Mod: 25

## 2017-01-20 PROCEDURE — 93010 ELECTROCARDIOGRAM REPORT: CPT

## 2017-01-20 PROCEDURE — 85610 PROTHROMBIN TIME: CPT

## 2017-01-20 PROCEDURE — 85730 THROMBOPLASTIN TIME PARTIAL: CPT

## 2017-01-20 RX ORDER — SODIUM CHLORIDE 9 MG/ML
3 INJECTION INTRAMUSCULAR; INTRAVENOUS; SUBCUTANEOUS ONCE
Qty: 0 | Refills: 0 | Status: DISCONTINUED | OUTPATIENT
Start: 2017-01-20 | End: 2017-01-24

## 2017-01-20 NOTE — ED ADULT NURSE NOTE - CHIEF COMPLAINT QUOTE
pt sent from facility after possible cardiac or respiratory arrest, on arrival pt awake, alert, BVM by EMS to chronic trach, pt no distress

## 2017-01-20 NOTE — ED ADULT NURSE NOTE - OBJECTIVE STATEMENT
Pt arrives to the ED from the nursing home, per EMS pt was a cardiac arrest that has now been resuscitated. EMS assisting with respirations with BVM. Pt is alert and able to answer questions.

## 2017-01-20 NOTE — ED ADULT NURSE NOTE - PMH
Afib    Afib    Anemia    Anemia, unspecified type    Bell's palsy    CHF (congestive heart failure)    CHF (congestive heart failure)    Chronic respiratory failure with hypoxia    Depression    Dysphagia, unspecified type    Gastrostomy in place    GERD (gastroesophageal reflux disease)    HTN (hypertension)    Hypothyroid    Pacemaker    PICC line infection, initial encounter    PNA (pneumonia)    Sepsis    Stroke    Tracheostomy in place    Uterine cancer    Vocal cord paralysis syndrome

## 2017-01-20 NOTE — ED ADULT NURSE NOTE - PSH
H/O tracheostomy    Mitral valve replaced    Status post right hip replacement    Status post tracheostomy

## 2017-01-20 NOTE — ED PROVIDER NOTE - PROGRESS NOTE DETAILS
pt felt better.  cardiomegaly, chronic.  case dw Bill, admitted in [facility] first week of Jan 2017 for same complaint.

## 2017-01-20 NOTE — ED ADULT NURSE NOTE - RESPIRATORY WDL
Breathing spontaneous and unlabored. Breath sounds clear and equal bilaterally with regular rhythm. Pt has chronic trach, pt now on trach collar with 40% O2

## 2017-02-08 ENCOUNTER — INPATIENT (INPATIENT)
Facility: HOSPITAL | Age: 82
LOS: 1 days | Discharge: NURSING, REHAB OR LONG-TERM CARE | DRG: 205 | End: 2017-02-10
Attending: INTERNAL MEDICINE | Admitting: INTERNAL MEDICINE
Payer: MEDICARE

## 2017-02-08 VITALS
OXYGEN SATURATION: 96 % | SYSTOLIC BLOOD PRESSURE: 100 MMHG | HEART RATE: 60 BPM | RESPIRATION RATE: 14 BRPM | DIASTOLIC BLOOD PRESSURE: 51 MMHG

## 2017-02-08 DIAGNOSIS — Z96.641 PRESENCE OF RIGHT ARTIFICIAL HIP JOINT: Chronic | ICD-10-CM

## 2017-02-08 DIAGNOSIS — R09.2 RESPIRATORY ARREST: ICD-10-CM

## 2017-02-08 DIAGNOSIS — I48.91 UNSPECIFIED ATRIAL FIBRILLATION: ICD-10-CM

## 2017-02-08 DIAGNOSIS — R13.10 DYSPHAGIA, UNSPECIFIED: ICD-10-CM

## 2017-02-08 DIAGNOSIS — J96.21 ACUTE AND CHRONIC RESPIRATORY FAILURE WITH HYPOXIA: ICD-10-CM

## 2017-02-08 DIAGNOSIS — Z95.2 PRESENCE OF PROSTHETIC HEART VALVE: Chronic | ICD-10-CM

## 2017-02-08 DIAGNOSIS — Z93.0 TRACHEOSTOMY STATUS: Chronic | ICD-10-CM

## 2017-02-08 DIAGNOSIS — I10 ESSENTIAL (PRIMARY) HYPERTENSION: ICD-10-CM

## 2017-02-08 DIAGNOSIS — E03.9 HYPOTHYROIDISM, UNSPECIFIED: ICD-10-CM

## 2017-02-08 DIAGNOSIS — Z41.8 ENCOUNTER FOR OTHER PROCEDURES FOR PURPOSES OTHER THAN REMEDYING HEALTH STATE: ICD-10-CM

## 2017-02-08 DIAGNOSIS — I50.33 ACUTE ON CHRONIC DIASTOLIC (CONGESTIVE) HEART FAILURE: ICD-10-CM

## 2017-02-08 DIAGNOSIS — Z71.89 OTHER SPECIFIED COUNSELING: ICD-10-CM

## 2017-02-08 DIAGNOSIS — D64.9 ANEMIA, UNSPECIFIED: ICD-10-CM

## 2017-02-08 DIAGNOSIS — Z98.89 OTHER SPECIFIED POSTPROCEDURAL STATES: Chronic | ICD-10-CM

## 2017-02-08 LAB
BASE EXCESS BLDA CALC-SCNC: 8.9 MMOL/L — HIGH (ref -2–2)
BLOOD GAS SOURCE: SIGNIFICANT CHANGE UP
HCO3 BLDA-SCNC: 32 MMOL/L — HIGH (ref 21–29)
HOROWITZ INDEX BLDA+IHG-RTO: SIGNIFICANT CHANGE UP
LACTATE SERPL-SCNC: 2.3 MMOL/L — HIGH (ref 0.7–2)
PCO2 BLDA: 53 MMHG — HIGH (ref 32–46)
PH BLD: 7.42 — SIGNIFICANT CHANGE UP (ref 7.35–7.45)
PO2 BLDA: 67 MMHG — LOW (ref 74–108)
SAO2 % BLDA: 94 % — SIGNIFICANT CHANGE UP (ref 92–96)

## 2017-02-08 PROCEDURE — 71010: CPT | Mod: 26

## 2017-02-08 PROCEDURE — 93010 ELECTROCARDIOGRAM REPORT: CPT

## 2017-02-08 PROCEDURE — 99285 EMERGENCY DEPT VISIT HI MDM: CPT

## 2017-02-08 RX ORDER — ACETAMINOPHEN 500 MG
650 TABLET ORAL
Qty: 0 | Refills: 0 | COMMUNITY

## 2017-02-08 RX ORDER — BUDESONIDE, MICRONIZED 100 %
0.5 POWDER (GRAM) MISCELLANEOUS
Qty: 0 | Refills: 0 | Status: DISCONTINUED | OUTPATIENT
Start: 2017-02-08 | End: 2017-02-10

## 2017-02-08 RX ORDER — LEVOTHYROXINE SODIUM 125 MCG
1 TABLET ORAL
Qty: 0 | Refills: 0 | COMMUNITY

## 2017-02-08 RX ORDER — DIGOXIN 250 MCG
1 TABLET ORAL
Qty: 0 | Refills: 0 | COMMUNITY

## 2017-02-08 RX ORDER — WARFARIN SODIUM 2.5 MG/1
1.5 TABLET ORAL
Qty: 0 | Refills: 0 | COMMUNITY

## 2017-02-08 RX ORDER — IPRATROPIUM/ALBUTEROL SULFATE 18-103MCG
3 AEROSOL WITH ADAPTER (GRAM) INHALATION EVERY 6 HOURS
Qty: 0 | Refills: 0 | Status: DISCONTINUED | OUTPATIENT
Start: 2017-02-08 | End: 2017-02-09

## 2017-02-08 RX ORDER — ACETAMINOPHEN 500 MG
650 TABLET ORAL EVERY 6 HOURS
Qty: 0 | Refills: 0 | Status: DISCONTINUED | OUTPATIENT
Start: 2017-02-08 | End: 2017-02-10

## 2017-02-08 RX ORDER — WARFARIN SODIUM 2.5 MG/1
1 TABLET ORAL ONCE
Qty: 0 | Refills: 0 | Status: COMPLETED | OUTPATIENT
Start: 2017-02-08 | End: 2017-02-08

## 2017-02-08 RX ORDER — DILTIAZEM HCL 120 MG
120 CAPSULE, EXT RELEASE 24 HR ORAL DAILY
Qty: 0 | Refills: 0 | Status: DISCONTINUED | OUTPATIENT
Start: 2017-02-08 | End: 2017-02-10

## 2017-02-08 RX ORDER — PANTOPRAZOLE SODIUM 20 MG/1
1 TABLET, DELAYED RELEASE ORAL
Qty: 0 | Refills: 0 | COMMUNITY

## 2017-02-08 RX ORDER — ALLOPURINOL 300 MG
100 TABLET ORAL DAILY
Qty: 0 | Refills: 0 | Status: DISCONTINUED | OUTPATIENT
Start: 2017-02-08 | End: 2017-02-10

## 2017-02-08 RX ORDER — POTASSIUM CHLORIDE 20 MEQ
10 PACKET (EA) ORAL
Qty: 0 | Refills: 0 | Status: DISCONTINUED | OUTPATIENT
Start: 2017-02-08 | End: 2017-02-09

## 2017-02-08 RX ORDER — FUROSEMIDE 40 MG
40 TABLET ORAL EVERY 12 HOURS
Qty: 0 | Refills: 0 | Status: DISCONTINUED | OUTPATIENT
Start: 2017-02-08 | End: 2017-02-09

## 2017-02-08 RX ORDER — DOCUSATE SODIUM 100 MG
100 CAPSULE ORAL DAILY
Qty: 0 | Refills: 0 | Status: DISCONTINUED | OUTPATIENT
Start: 2017-02-08 | End: 2017-02-10

## 2017-02-08 RX ORDER — ASPIRIN/CALCIUM CARB/MAGNESIUM 324 MG
81 TABLET ORAL DAILY
Qty: 0 | Refills: 0 | Status: DISCONTINUED | OUTPATIENT
Start: 2017-02-08 | End: 2017-02-10

## 2017-02-08 RX ORDER — LISINOPRIL 2.5 MG/1
2.5 TABLET ORAL DAILY
Qty: 0 | Refills: 0 | Status: DISCONTINUED | OUTPATIENT
Start: 2017-02-08 | End: 2017-02-10

## 2017-02-08 RX ORDER — PANTOPRAZOLE SODIUM 20 MG/1
40 TABLET, DELAYED RELEASE ORAL
Qty: 0 | Refills: 0 | Status: DISCONTINUED | OUTPATIENT
Start: 2017-02-08 | End: 2017-02-10

## 2017-02-08 RX ORDER — GABAPENTIN 400 MG/1
200 CAPSULE ORAL
Qty: 0 | Refills: 0 | Status: DISCONTINUED | OUTPATIENT
Start: 2017-02-08 | End: 2017-02-10

## 2017-02-08 RX ORDER — ALLOPURINOL 300 MG
0 TABLET ORAL
Qty: 0 | Refills: 0 | COMMUNITY

## 2017-02-08 RX ORDER — IPRATROPIUM/ALBUTEROL SULFATE 18-103MCG
3 AEROSOL WITH ADAPTER (GRAM) INHALATION EVERY 4 HOURS
Qty: 0 | Refills: 0 | Status: DISCONTINUED | OUTPATIENT
Start: 2017-02-08 | End: 2017-02-10

## 2017-02-08 RX ORDER — LEVOTHYROXINE SODIUM 125 MCG
25 TABLET ORAL DAILY
Qty: 0 | Refills: 0 | Status: DISCONTINUED | OUTPATIENT
Start: 2017-02-08 | End: 2017-02-09

## 2017-02-08 RX ADMIN — Medication 40 MILLIGRAM(S): at 13:16

## 2017-02-08 RX ADMIN — Medication 3 MILLILITER(S): at 20:05

## 2017-02-08 RX ADMIN — Medication 10 MILLIEQUIVALENT(S): at 18:05

## 2017-02-08 RX ADMIN — Medication 40 MILLIGRAM(S): at 17:57

## 2017-02-08 RX ADMIN — Medication 1 TABLET(S): at 17:57

## 2017-02-08 RX ADMIN — LISINOPRIL 2.5 MILLIGRAM(S): 2.5 TABLET ORAL at 12:25

## 2017-02-08 RX ADMIN — WARFARIN SODIUM 1 MILLIGRAM(S): 2.5 TABLET ORAL at 22:24

## 2017-02-08 RX ADMIN — GABAPENTIN 200 MILLIGRAM(S): 400 CAPSULE ORAL at 17:57

## 2017-02-08 RX ADMIN — Medication 25 MICROGRAM(S): at 18:05

## 2017-02-08 RX ADMIN — Medication 100 MILLIGRAM(S): at 12:25

## 2017-02-08 RX ADMIN — Medication 40 MILLIGRAM(S): at 22:25

## 2017-02-08 RX ADMIN — Medication 81 MILLIGRAM(S): at 12:25

## 2017-02-08 RX ADMIN — Medication 0.5 MILLIGRAM(S): at 20:06

## 2017-02-08 RX ADMIN — Medication 120 MILLIGRAM(S): at 12:25

## 2017-02-08 RX ADMIN — Medication 100 MILLIGRAM(S): at 17:57

## 2017-02-08 NOTE — H&P ADULT. - PROBLEM SELECTOR PLAN 3
Continue Cardizem for rate control.  Will hold dig due to increased level.  Continue Coumadin for anticoagulation.   Further management as per clinical course.

## 2017-02-08 NOTE — H&P ADULT. - RS GEN PE MLT RESP DETAILS PC
diminished breath sounds, R/diminished breath sounds, L/no intercostal retractions/no chest wall tenderness/rales

## 2017-02-08 NOTE — H&P ADULT. - GASTROINTESTINAL DETAILS
soft/nontender/no distention/bowel sounds normal/no rebound tenderness/no bruit/no guarding/no masses palpable/no rigidity/no organomegaly

## 2017-02-08 NOTE — H&P ADULT. - MUSCULOSKELETAL
negative detailed exam no calf tenderness/no joint warmth/no joint erythema/no joint swelling/ROM intact

## 2017-02-08 NOTE — H&P ADULT. - ASSESSMENT
83-year-old with history of chronic respiratory failure, hypoxemia, tracheostomy, atrial fibrillation, congestive heart failure, MVR, Hip fracture, vocal cord paralysis, anemia, Bell's palsy, dysphagia, stroke and hypothyroidism, who now comes in with resp failure, trach complication and chf.

## 2017-02-08 NOTE — H&P ADULT. - HISTORY OF PRESENT ILLNESS
83-year-old female who has past medical history of chronic respiratory failure, hypoxemia, tracheostomy, atrial fibrillation, congestive heart failure, MVR, Hip fracture, vocal cord paralysis, anemia, Bell's palsy, dysphagia, stroke and hypothyroidism, who was sent in to the emergency room from nursing home due to acute onset shortness of breath and respiratory arrest. Patient had dislodged Trach tube, which was replaced in [facility] and patient was sent in to ER.   Patient was noted to have acute on chronic respiratory failure with hypoxemia. She is being admitted for further care.     patient denies any chest pain or pressure. No nausea or vomiting. No fever or chills.  + SOB, + cough, no expectoration.

## 2017-02-08 NOTE — H&P ADULT. - PROBLEM SELECTOR PLAN 2
Patient with possible acute on chronic diastolic heart failure.  Will place on IV lasix.  Continue Lisinopril and Cardizem.  No beta blocker due to reactive airway disease.   Monitor I/O and weights.  Monitor BMP.  Further management as per clinical course.

## 2017-02-08 NOTE — H&P ADULT. - PROBLEM SELECTOR PLAN 1
Admit to SPCU  patient weaned off vent by pulmonary.  Continue Trach collar at 40 and monitor patient.   Pulm/CC input noted and appreciated.   Further management as per clinical course.

## 2017-02-09 LAB
ANION GAP SERPL CALC-SCNC: 3 MMOL/L — LOW (ref 5–17)
BASE EXCESS BLDA CALC-SCNC: 9.3 MMOL/L — HIGH (ref -2–2)
BASOPHILS # BLD AUTO: 0 K/UL — SIGNIFICANT CHANGE UP (ref 0–0.2)
BASOPHILS NFR BLD AUTO: 0.1 % — SIGNIFICANT CHANGE UP (ref 0–2)
BLOOD GAS COMMENTS: SIGNIFICANT CHANGE UP
BLOOD GAS SOURCE: SIGNIFICANT CHANGE UP
BUN SERPL-MCNC: 48 MG/DL — HIGH (ref 7–23)
CALCIUM SERPL-MCNC: 8.9 MG/DL — SIGNIFICANT CHANGE UP (ref 8.4–10.5)
CHLORIDE SERPL-SCNC: 107 MMOL/L — SIGNIFICANT CHANGE UP (ref 96–108)
CO2 SERPL-SCNC: 38 MMOL/L — HIGH (ref 22–31)
CREAT SERPL-MCNC: 1.75 MG/DL — HIGH (ref 0.5–1.3)
EOSINOPHIL # BLD AUTO: 0 K/UL — SIGNIFICANT CHANGE UP (ref 0–0.5)
EOSINOPHIL NFR BLD AUTO: 0.1 % — SIGNIFICANT CHANGE UP (ref 0–6)
GLUCOSE SERPL-MCNC: 148 MG/DL — HIGH (ref 70–99)
HCO3 BLDA-SCNC: 33 MMOL/L — HIGH (ref 21–29)
HCT VFR BLD CALC: 32.8 % — LOW (ref 34.5–45)
HGB BLD-MCNC: 10.2 G/DL — LOW (ref 11.5–15.5)
HOROWITZ INDEX BLDA+IHG-RTO: SIGNIFICANT CHANGE UP
INR BLD: 2.09 RATIO — HIGH (ref 0.88–1.16)
LYMPHOCYTES # BLD AUTO: 0.4 K/UL — LOW (ref 1–3.3)
LYMPHOCYTES # BLD AUTO: 4.3 % — LOW (ref 13–44)
MAGNESIUM SERPL-MCNC: 2.2 MG/DL — SIGNIFICANT CHANGE UP (ref 1.6–2.6)
MCHC RBC-ENTMCNC: 30.7 PG — SIGNIFICANT CHANGE UP (ref 27–34)
MCHC RBC-ENTMCNC: 31 GM/DL — LOW (ref 32–36)
MCV RBC AUTO: 99.2 FL — SIGNIFICANT CHANGE UP (ref 80–100)
MONOCYTES # BLD AUTO: 0.1 K/UL — SIGNIFICANT CHANGE UP (ref 0–0.9)
MONOCYTES NFR BLD AUTO: 0.7 % — LOW (ref 2–14)
NEUTROPHILS # BLD AUTO: 9.7 K/UL — HIGH (ref 1.8–7.4)
NEUTROPHILS NFR BLD AUTO: 94.8 % — HIGH (ref 43–77)
PCO2 BLDA: 51 MMHG — HIGH (ref 32–46)
PH BLD: 7.44 — SIGNIFICANT CHANGE UP (ref 7.35–7.45)
PHOSPHATE SERPL-MCNC: 3.6 MG/DL — SIGNIFICANT CHANGE UP (ref 2.5–4.5)
PLATELET # BLD AUTO: 169 K/UL — SIGNIFICANT CHANGE UP (ref 150–400)
PO2 BLDA: 68 MMHG — LOW (ref 74–108)
POTASSIUM SERPL-MCNC: 5.4 MMOL/L — HIGH (ref 3.5–5.3)
POTASSIUM SERPL-SCNC: 5.4 MMOL/L — HIGH (ref 3.5–5.3)
PROTHROM AB SERPL-ACNC: 23.6 SEC — HIGH (ref 10–13.1)
RBC # BLD: 3.31 M/UL — LOW (ref 3.8–5.2)
RBC # FLD: 16.4 % — HIGH (ref 10.3–14.5)
SAO2 % BLDA: 95 % — SIGNIFICANT CHANGE UP (ref 92–96)
SODIUM SERPL-SCNC: 148 MMOL/L — HIGH (ref 135–145)
T3 SERPL-MCNC: 30 NG/DL — LOW (ref 80–200)
T4 AB SER-ACNC: 3.1 UG/DL — LOW (ref 4.6–12)
TSH SERPL-MCNC: 12.06 UIU/ML — HIGH (ref 0.27–4.2)
WBC # BLD: 10.2 K/UL — SIGNIFICANT CHANGE UP (ref 3.8–10.5)
WBC # FLD AUTO: 10.2 K/UL — SIGNIFICANT CHANGE UP (ref 3.8–10.5)

## 2017-02-09 RX ORDER — FUROSEMIDE 40 MG
40 TABLET ORAL DAILY
Qty: 0 | Refills: 0 | Status: DISCONTINUED | OUTPATIENT
Start: 2017-02-10 | End: 2017-02-10

## 2017-02-09 RX ORDER — IPRATROPIUM/ALBUTEROL SULFATE 18-103MCG
3 AEROSOL WITH ADAPTER (GRAM) INHALATION EVERY 6 HOURS
Qty: 0 | Refills: 0 | Status: DISCONTINUED | OUTPATIENT
Start: 2017-02-09 | End: 2017-02-10

## 2017-02-09 RX ORDER — LEVOTHYROXINE SODIUM 125 MCG
50 TABLET ORAL DAILY
Qty: 0 | Refills: 0 | Status: DISCONTINUED | OUTPATIENT
Start: 2017-02-09 | End: 2017-02-10

## 2017-02-09 RX ORDER — WARFARIN SODIUM 2.5 MG/1
1 TABLET ORAL ONCE
Qty: 0 | Refills: 0 | Status: COMPLETED | OUTPATIENT
Start: 2017-02-09 | End: 2017-02-09

## 2017-02-09 RX ADMIN — Medication 40 MILLIGRAM(S): at 06:33

## 2017-02-09 RX ADMIN — WARFARIN SODIUM 1 MILLIGRAM(S): 2.5 TABLET ORAL at 22:00

## 2017-02-09 RX ADMIN — PANTOPRAZOLE SODIUM 40 MILLIGRAM(S): 20 TABLET, DELAYED RELEASE ORAL at 06:33

## 2017-02-09 RX ADMIN — Medication 3 MILLILITER(S): at 14:00

## 2017-02-09 RX ADMIN — Medication 25 MICROGRAM(S): at 06:33

## 2017-02-09 RX ADMIN — Medication 40 MILLIGRAM(S): at 21:55

## 2017-02-09 RX ADMIN — GABAPENTIN 200 MILLIGRAM(S): 400 CAPSULE ORAL at 06:33

## 2017-02-09 RX ADMIN — Medication 1 TABLET(S): at 11:34

## 2017-02-09 RX ADMIN — LISINOPRIL 2.5 MILLIGRAM(S): 2.5 TABLET ORAL at 06:33

## 2017-02-09 RX ADMIN — Medication 10 MILLIEQUIVALENT(S): at 06:33

## 2017-02-09 RX ADMIN — Medication 100 MILLIGRAM(S): at 11:34

## 2017-02-09 RX ADMIN — Medication 0.5 MILLIGRAM(S): at 07:41

## 2017-02-09 RX ADMIN — GABAPENTIN 200 MILLIGRAM(S): 400 CAPSULE ORAL at 18:13

## 2017-02-09 RX ADMIN — Medication 3 MILLILITER(S): at 19:59

## 2017-02-09 RX ADMIN — Medication 40 MILLIGRAM(S): at 13:54

## 2017-02-09 RX ADMIN — Medication 3 MILLILITER(S): at 07:41

## 2017-02-09 RX ADMIN — Medication 3 MILLILITER(S): at 00:40

## 2017-02-09 RX ADMIN — Medication 120 MILLIGRAM(S): at 06:33

## 2017-02-09 RX ADMIN — Medication 81 MILLIGRAM(S): at 11:34

## 2017-02-09 RX ADMIN — Medication 40 MILLIGRAM(S): at 18:13

## 2017-02-09 RX ADMIN — Medication 0.5 MILLIGRAM(S): at 19:59

## 2017-02-09 NOTE — DIETITIAN INITIAL EVALUATION ADULT. - OTHER INFO
83yr old chinese female admit with resp arrest from [facility]; pt trach dislodged @ SNF and placed on vent in ED; MOLST with DNR in place; generalized edema noted; unable to review preferences with pt due to vocal cord paralysis; impaired skin integrity noted; appears to require assistance and supervision with meals; needs are anticipated, roma due to nonverbal; NKFA; not a candidate for coumadin education, due to SNF patient; as per NSG, pt understands English, but primary language is Chinese; Dtr Cait Lemus appears very involved; dysphagia 3 soft consistency diet with thin liquids appears appropriate and well tolerated; wt maintenance is desirable; will follow

## 2017-02-09 NOTE — DIETITIAN INITIAL EVALUATION ADULT. - PERTINENT LABORATORY DATA
2/9 - H/H 10.2L/32.8L, Na+148H, K+ 5.4H, BUN 48H, Creat 1.75H, GFR 26L;  2/8- Alb 3.1L, Na+ 146H, Glucose 147H, BUN 49H, Creat 1.61H, GFR 29L, WBC 10.9H, H/H 9.4L/30.8L; 1/3 @ [facility]- BUN 54H, Creat 1.55H, GFR 41L

## 2017-02-10 ENCOUNTER — TRANSCRIPTION ENCOUNTER (OUTPATIENT)
Age: 82
End: 2017-02-10

## 2017-02-10 VITALS
RESPIRATION RATE: 19 BRPM | HEART RATE: 61 BPM | OXYGEN SATURATION: 100 % | DIASTOLIC BLOOD PRESSURE: 56 MMHG | TEMPERATURE: 98 F | SYSTOLIC BLOOD PRESSURE: 131 MMHG

## 2017-02-10 LAB
ANION GAP SERPL CALC-SCNC: 4 MMOL/L — LOW (ref 5–17)
BUN SERPL-MCNC: 62 MG/DL — HIGH (ref 7–23)
CALCIUM SERPL-MCNC: 9 MG/DL — SIGNIFICANT CHANGE UP (ref 8.4–10.5)
CHLORIDE SERPL-SCNC: 106 MMOL/L — SIGNIFICANT CHANGE UP (ref 96–108)
CO2 SERPL-SCNC: 36 MMOL/L — HIGH (ref 22–31)
CREAT SERPL-MCNC: 1.83 MG/DL — HIGH (ref 0.5–1.3)
GLUCOSE SERPL-MCNC: 167 MG/DL — HIGH (ref 70–99)
HCT VFR BLD CALC: 31.2 % — LOW (ref 34.5–45)
HGB BLD-MCNC: 9.7 G/DL — LOW (ref 11.5–15.5)
INR BLD: 2.18 RATIO — HIGH (ref 0.88–1.16)
MCHC RBC-ENTMCNC: 30.5 PG — SIGNIFICANT CHANGE UP (ref 27–34)
MCHC RBC-ENTMCNC: 31 GM/DL — LOW (ref 32–36)
MCV RBC AUTO: 98.5 FL — SIGNIFICANT CHANGE UP (ref 80–100)
PLATELET # BLD AUTO: 173 K/UL — SIGNIFICANT CHANGE UP (ref 150–400)
POTASSIUM SERPL-MCNC: 4.5 MMOL/L — SIGNIFICANT CHANGE UP (ref 3.5–5.3)
POTASSIUM SERPL-SCNC: 4.5 MMOL/L — SIGNIFICANT CHANGE UP (ref 3.5–5.3)
PROTHROM AB SERPL-ACNC: 24.6 SEC — HIGH (ref 10–13.1)
RBC # BLD: 3.17 M/UL — LOW (ref 3.8–5.2)
RBC # FLD: 16.7 % — HIGH (ref 10.3–14.5)
SODIUM SERPL-SCNC: 146 MMOL/L — HIGH (ref 135–145)
WBC # BLD: 8.9 K/UL — SIGNIFICANT CHANGE UP (ref 3.8–10.5)
WBC # FLD AUTO: 8.9 K/UL — SIGNIFICANT CHANGE UP (ref 3.8–10.5)

## 2017-02-10 PROCEDURE — 80162 ASSAY OF DIGOXIN TOTAL: CPT

## 2017-02-10 PROCEDURE — 82803 BLOOD GASES ANY COMBINATION: CPT

## 2017-02-10 PROCEDURE — 85610 PROTHROMBIN TIME: CPT

## 2017-02-10 PROCEDURE — 71010: CPT | Mod: 26

## 2017-02-10 PROCEDURE — 99285 EMERGENCY DEPT VISIT HI MDM: CPT

## 2017-02-10 PROCEDURE — 84443 ASSAY THYROID STIM HORMONE: CPT

## 2017-02-10 PROCEDURE — 71045 X-RAY EXAM CHEST 1 VIEW: CPT

## 2017-02-10 PROCEDURE — 87040 BLOOD CULTURE FOR BACTERIA: CPT

## 2017-02-10 PROCEDURE — 85730 THROMBOPLASTIN TIME PARTIAL: CPT

## 2017-02-10 PROCEDURE — 83605 ASSAY OF LACTIC ACID: CPT

## 2017-02-10 PROCEDURE — 85027 COMPLETE CBC AUTOMATED: CPT

## 2017-02-10 PROCEDURE — 80053 COMPREHEN METABOLIC PANEL: CPT

## 2017-02-10 PROCEDURE — 84484 ASSAY OF TROPONIN QUANT: CPT

## 2017-02-10 PROCEDURE — 80048 BASIC METABOLIC PNL TOTAL CA: CPT

## 2017-02-10 PROCEDURE — 82553 CREATINE MB FRACTION: CPT

## 2017-02-10 PROCEDURE — 36415 COLL VENOUS BLD VENIPUNCTURE: CPT

## 2017-02-10 PROCEDURE — 84480 ASSAY TRIIODOTHYRONINE (T3): CPT

## 2017-02-10 PROCEDURE — 36600 WITHDRAWAL OF ARTERIAL BLOOD: CPT

## 2017-02-10 PROCEDURE — 83735 ASSAY OF MAGNESIUM: CPT

## 2017-02-10 PROCEDURE — 93005 ELECTROCARDIOGRAM TRACING: CPT

## 2017-02-10 PROCEDURE — 84436 ASSAY OF TOTAL THYROXINE: CPT

## 2017-02-10 PROCEDURE — 84100 ASSAY OF PHOSPHORUS: CPT

## 2017-02-10 PROCEDURE — 94640 AIRWAY INHALATION TREATMENT: CPT

## 2017-02-10 PROCEDURE — 82550 ASSAY OF CK (CPK): CPT

## 2017-02-10 RX ORDER — BUDESONIDE, MICRONIZED 100 %
2 POWDER (GRAM) MISCELLANEOUS
Qty: 0 | Refills: 0 | COMMUNITY
Start: 2017-02-10

## 2017-02-10 RX ORDER — LEVOTHYROXINE SODIUM 125 MCG
1 TABLET ORAL
Qty: 0 | Refills: 0 | COMMUNITY
Start: 2017-02-10

## 2017-02-10 RX ORDER — ACETAMINOPHEN 500 MG
2 TABLET ORAL
Qty: 0 | Refills: 0 | COMMUNITY
Start: 2017-02-10

## 2017-02-10 RX ORDER — LISINOPRIL 2.5 MG/1
1 TABLET ORAL
Qty: 0 | Refills: 0 | COMMUNITY
Start: 2017-02-10

## 2017-02-10 RX ORDER — POTASSIUM CHLORIDE 20 MEQ
1 PACKET (EA) ORAL
Qty: 0 | Refills: 0 | COMMUNITY

## 2017-02-10 RX ADMIN — Medication 650 MILLIGRAM(S): at 07:30

## 2017-02-10 RX ADMIN — Medication 3 MILLILITER(S): at 08:20

## 2017-02-10 RX ADMIN — PANTOPRAZOLE SODIUM 40 MILLIGRAM(S): 20 TABLET, DELAYED RELEASE ORAL at 06:36

## 2017-02-10 RX ADMIN — GABAPENTIN 200 MILLIGRAM(S): 400 CAPSULE ORAL at 06:32

## 2017-02-10 RX ADMIN — Medication 3 MILLILITER(S): at 01:06

## 2017-02-10 RX ADMIN — Medication 100 MILLIGRAM(S): at 12:02

## 2017-02-10 RX ADMIN — Medication 81 MILLIGRAM(S): at 12:02

## 2017-02-10 RX ADMIN — Medication 120 MILLIGRAM(S): at 06:31

## 2017-02-10 RX ADMIN — Medication 50 MICROGRAM(S): at 06:36

## 2017-02-10 RX ADMIN — Medication 1 TABLET(S): at 12:02

## 2017-02-10 RX ADMIN — Medication 3 MILLILITER(S): at 13:34

## 2017-02-10 RX ADMIN — LISINOPRIL 2.5 MILLIGRAM(S): 2.5 TABLET ORAL at 06:32

## 2017-02-10 RX ADMIN — Medication 0.5 MILLIGRAM(S): at 08:19

## 2017-02-10 RX ADMIN — Medication 40 MILLIGRAM(S): at 06:36

## 2017-02-10 RX ADMIN — Medication 650 MILLIGRAM(S): at 06:38

## 2017-02-10 NOTE — DISCHARGE NOTE ADULT - MEDICATION SUMMARY - MEDICATIONS TO CHANGE
I will SWITCH the dose or number of times a day I take the medications listed below when I get home from the hospital:    levothyroxine 25 mcg (0.025 mg) oral tablet  -- 1 tab(s) by mouth once a day    Synthroid 88 mcg (0.088 mg) oral tablet  -- 1 tab(s) by gastrostomy tube once a day    prednisoLONE (as sodium phosphate) 20 mg/5 mL oral liquid  -- 2.5 milliliter(s) by gastrostomy tube once a day    predniSONE 10 mg oral tablet  -- 1 tab(s) by mouth once a day    warfarin 2.5 mg oral tablet  -- 1 tab(s) by mouth once a day    furosemide 20 mg oral tablet  -- 1 tab(s) by mouth once a day    Synthroid 25 mcg (0.025 mg) oral tablet  -- 1 tab(s) by mouth once a day    Coumadin  -- 1.5 milligram(s) by mouth once a day (at bedtime)

## 2017-02-10 NOTE — DISCHARGE NOTE ADULT - NS AS ACTIVITY OBS
Stairs allowed/Do not drive or operate machinery/Walking-Outdoors allowed/Walking-Indoors allowed/Showering allowed/No Heavy lifting/straining/Do not make important decisions/Bathing allowed

## 2017-02-10 NOTE — DISCHARGE NOTE ADULT - PLAN OF CARE
Breath better Soft diet with thin liquids.   Aspiration precautions.  Increase activity as tolerated at [facility]  Follow up with Dr. Toledo at [facility]

## 2017-02-10 NOTE — DISCHARGE NOTE ADULT - SECONDARY DIAGNOSIS.
Acute on chronic diastolic congestive heart failure Paroxysmal atrial fibrillation Chronic obstructive pulmonary disease, unspecified COPD type Essential hypertension CKD (chronic kidney disease) stage 3, GFR 30-59 ml/min Anemia, unspecified type

## 2017-02-10 NOTE — DISCHARGE NOTE ADULT - CARE PROVIDER_API CALL
Radha Toledo (BRADY), Internal Medicine  [address] [place] [address]  Phone: [phone]  Fax: [phone]

## 2017-02-10 NOTE — DISCHARGE NOTE ADULT - CARE PLAN
Principal Discharge DX:	Acute on chronic respiratory failure with hypoxia and hypercapnia  Goal:	Breath better  Instructions for follow-up, activity and diet:	Soft diet with thin liquids.   Aspiration precautions.  Increase activity as tolerated at [facility]  Follow up with Dr. Toledo at [facility]  Secondary Diagnosis:	Acute on chronic diastolic congestive heart failure  Secondary Diagnosis:	Paroxysmal atrial fibrillation  Secondary Diagnosis:	Chronic obstructive pulmonary disease, unspecified COPD type  Secondary Diagnosis:	Essential hypertension  Secondary Diagnosis:	CKD (chronic kidney disease) stage 3, GFR 30-59 ml/min  Secondary Diagnosis:	Anemia, unspecified type Principal Discharge DX:	Acute on chronic respiratory failure with hypoxia and hypercapnia  Goal:	Breath better  Instructions for follow-up, activity and diet:	Soft diet with thin liquids.   Aspiration precautions.  Increase activity as tolerated at [facility]  Follow up with Dr. Toledo at [facility]  Secondary Diagnosis:	Acute on chronic diastolic congestive heart failure  Secondary Diagnosis:	Paroxysmal atrial fibrillation  Secondary Diagnosis:	Chronic obstructive pulmonary disease, unspecified COPD type  Secondary Diagnosis:	Essential hypertension  Secondary Diagnosis:	CKD (chronic kidney disease) stage 3, GFR 30-59 ml/min  Secondary Diagnosis:	Anemia, unspecified type

## 2017-02-10 NOTE — DISCHARGE NOTE ADULT - HOSPITAL COURSE
Patient admitted with acute on chronic resp failure with hypoxemia and hypercapnia.  Patient was placed on vent initially in ER and then weaned off quickly to TC. She was in acute on chronic diastolic heart failure. She was placed on IV Lasix. Patient was also given steroids for her COPD/Asthma. She slowly improved. Her TSH was noted to be high and her dose of Synthroid was adjusted. Patient is doing better and she is being discharged to [facility] for LTC. Her prognosis remains very guarded. Patient is DNR.

## 2017-02-10 NOTE — DISCHARGE NOTE ADULT - PATIENT PORTAL LINK FT
“You can access the FollowHealth Patient Portal, offered by [facility], by registering with the following website: [email or web address]”

## 2017-02-10 NOTE — DISCHARGE NOTE ADULT - MEDICATION SUMMARY - MEDICATIONS TO TAKE
I will START or STAY ON the medications listed below when I get home from the hospital:    predniSONE 10 mg oral tablet  -- 4 tab(s) by mouth once a day for 3 days then 3 tabs by mouth daily for 3 days then 2 tabs by mouth daily for 3 days then 1 tab by mouth daily  -- Indication: For COPD    budesonide 0.5 mg/2 mL inhalation suspension  -- 2 milliliter(s) inhaled 2 times a day  -- Indication: For COPD    acetaminophen 325 mg oral tablet  -- 2 tab(s) by mouth every 6 hours, As needed, For Temp greater than 38.5 C (101.3 F)  -- Indication: For Fever    acetaminophen 325 mg oral tablet  -- 2 tab(s) by mouth every 6 hours, As needed, Mild Pain (1 - 3)  -- Indication: For Pain    aspirin 81 mg oral tablet  -- 1 tab(s) by gastrostomy tube once a day  -- Indication: For Prophylaxis    lisinopril 2.5 mg oral tablet  -- 1 tab(s) by mouth once a day  -- Indication: For HTN    digoxin 125 mcg (0.125 mg) oral tablet  -- 1 tab(s) by mouth once a day  -- Indication: For AFIB    diltiaZEM 120 mg/24 hours oral capsule, extended release  -- 1 cap(s) by mouth once a day  -- Indication: For AFIB/HTN    Coumadin 1 mg oral tablet  -- 1 tab(s) by mouth once a day hold for INR >3  -- Indication: For AFIB    Neurontin 100 mg oral capsule  -- 2 cap(s) by mouth 2 times a day  -- Indication: For Neuropathy    allopurinol 100 mg oral tablet  -- 1 tab(s) by mouth once a day  -- Indication: For Gout    DuoNeb 0.5 mg-2.5 mg/3 mL inhalation solution  --  inhaled every 6 hours  -- Indication: For COPD    albuterol-ipratropium 2.5 mg-0.5 mg/3 mL inhalation solution  -- 3 milliliter(s) inhaled every 4 hours, As Needed - for bronchospasm  -- Indication: For COPD    Lasix 40 mg oral tablet  -- 1 tab(s) by mouth once a day  -- Indication: For Acute on chronic diastolic congestive heart failure    Siltussin 100 mg/5 mL oral liquid  -- 10 milliliter(s) by mouth every 6 hours  -- Indication: For Cough    Colace 100 mg oral capsule  --  by mouth once a day  -- Indication: For Constipation    pantoprazole 40 mg oral delayed release tablet  -- 1 tab(s) by mouth once a day  -- Indication: For GERD    levothyroxine 50 mcg (0.05 mg) oral tablet  -- 1 tab(s) by mouth once a day  -- Indication: For Hypothyroidism, unspecified type    Multiple Vitamins oral tablet  -- 1 tab(s) by mouth once a day  -- Indication: For Supplement

## 2017-02-13 LAB
CULTURE RESULTS: SIGNIFICANT CHANGE UP
CULTURE RESULTS: SIGNIFICANT CHANGE UP
SPECIMEN SOURCE: SIGNIFICANT CHANGE UP
SPECIMEN SOURCE: SIGNIFICANT CHANGE UP

## 2017-03-15 ENCOUNTER — INPATIENT (INPATIENT)
Facility: HOSPITAL | Age: 82
LOS: 12 days | Discharge: NURSING, REHAB OR LONG-TERM CARE | DRG: 871 | End: 2017-03-28
Attending: INTERNAL MEDICINE | Admitting: INTERNAL MEDICINE
Payer: MEDICARE

## 2017-03-15 VITALS
OXYGEN SATURATION: 94 % | WEIGHT: 160.06 LBS | RESPIRATION RATE: 30 BRPM | TEMPERATURE: 99 F | DIASTOLIC BLOOD PRESSURE: 61 MMHG | SYSTOLIC BLOOD PRESSURE: 122 MMHG | HEART RATE: 62 BPM | HEIGHT: 63 IN

## 2017-03-15 DIAGNOSIS — Z98.89 OTHER SPECIFIED POSTPROCEDURAL STATES: Chronic | ICD-10-CM

## 2017-03-15 DIAGNOSIS — Z93.0 TRACHEOSTOMY STATUS: Chronic | ICD-10-CM

## 2017-03-15 DIAGNOSIS — Z95.2 PRESENCE OF PROSTHETIC HEART VALVE: Chronic | ICD-10-CM

## 2017-03-15 DIAGNOSIS — Z96.641 PRESENCE OF RIGHT ARTIFICIAL HIP JOINT: Chronic | ICD-10-CM

## 2017-03-15 LAB
APTT BLD: 39.6 SEC — HIGH (ref 27.5–37.4)
BASOPHILS # BLD AUTO: 0.1 K/UL — SIGNIFICANT CHANGE UP (ref 0–0.2)
BASOPHILS NFR BLD AUTO: 0.8 % — SIGNIFICANT CHANGE UP (ref 0–2)
EOSINOPHIL # BLD AUTO: 0 K/UL — SIGNIFICANT CHANGE UP (ref 0–0.5)
EOSINOPHIL NFR BLD AUTO: 0.5 % — SIGNIFICANT CHANGE UP (ref 0–6)
HCT VFR BLD CALC: 30.5 % — LOW (ref 34.5–45)
HGB BLD-MCNC: 8.6 G/DL — LOW (ref 11.5–15.5)
INR BLD: 2.56 RATIO — HIGH (ref 0.88–1.16)
LYMPHOCYTES # BLD AUTO: 0.5 K/UL — LOW (ref 1–3.3)
LYMPHOCYTES # BLD AUTO: 5.4 % — LOW (ref 13–44)
MCHC RBC-ENTMCNC: 28.1 GM/DL — LOW (ref 32–36)
MCHC RBC-ENTMCNC: 29.4 PG — SIGNIFICANT CHANGE UP (ref 27–34)
MCV RBC AUTO: 104.4 FL — HIGH (ref 80–100)
MONOCYTES # BLD AUTO: 0.6 K/UL — SIGNIFICANT CHANGE UP (ref 0–0.9)
MONOCYTES NFR BLD AUTO: 7.2 % — SIGNIFICANT CHANGE UP (ref 1–9)
NEUTROPHILS # BLD AUTO: 7.4 K/UL — SIGNIFICANT CHANGE UP (ref 1.8–7.4)
NEUTROPHILS NFR BLD AUTO: 86.2 % — HIGH (ref 43–77)
PLATELET # BLD AUTO: 240 K/UL — SIGNIFICANT CHANGE UP (ref 150–400)
PROTHROM AB SERPL-ACNC: 28.7 SEC — HIGH (ref 10–13.1)
RBC # BLD: 2.92 M/UL — LOW (ref 3.8–5.2)
RBC # FLD: 19.2 % — HIGH (ref 10.3–14.5)
WBC # BLD: 8.6 K/UL — SIGNIFICANT CHANGE UP (ref 3.8–10.5)
WBC # FLD AUTO: 8.6 K/UL — SIGNIFICANT CHANGE UP (ref 3.8–10.5)

## 2017-03-15 RX ORDER — IPRATROPIUM/ALBUTEROL SULFATE 18-103MCG
3 AEROSOL WITH ADAPTER (GRAM) INHALATION ONCE
Qty: 0 | Refills: 0 | Status: COMPLETED | OUTPATIENT
Start: 2017-03-15 | End: 2017-03-15

## 2017-03-15 NOTE — ED PROVIDER NOTE - PROGRESS NOTE DETAILS
alerted Dr. De La Paz, covering for Dr. Sinha, patient going to ICU, from [facility] Dr. Meyer

## 2017-03-15 NOTE — ED ADULT NURSE NOTE - CARDIO WDL
Normal rate, regular rhythm, normal S1, S2 heart sounds heard. Pacemaker in place Left upper chest wall.

## 2017-03-15 NOTE — ED ADULT NURSE NOTE - OBJECTIVE STATEMENT
83 year old female presents to the ED via ambulance from [facility] with c/o increased SOB and altered mental status. Pt somewhat alert- unable to assess orientation. Pt non-verbal. Trach in place. Oxygen saturation on admittance 88%- oxygen supplemented and Respiratory Services called. NSR on the monitor. Pacemaker noted on upper left chest wall. Upper extremities + 3 weeping edema bilaterally. Pulses weak bilaterally. Rectal temp 99.2. BLE's non-weeping + 2 edema noted. Venous Stasis ulcer stage II measuring 2 cm x 1.5 cm x 0.3 cm noted. Hands and feet cyanotic and cooler than overall temperature of the rest of the body. BP low, Pulse normal, Oxygen saturation low, temp WDL. Pt tearful when awake and non-verbal. Pt lethargic but arousable with pain. Pt's trach suctioned- thick creamy mucous. PERRLA.

## 2017-03-15 NOTE — ED ADULT NURSE NOTE - FALL HARM RISK
coagulation(Bleeding disorder R/T clinical cond/anti-coags)/bones(Osteoporosis,prev fx,steroid use,metastatic bone ca/age(85 years old or older)/other

## 2017-03-15 NOTE — ED PROVIDER NOTE - CRITICAL CARE PROVIDED
consultation with other physicians/documentation/interpretation of diagnostic studies/direct patient care (not related to procedure)

## 2017-03-16 DIAGNOSIS — D64.9 ANEMIA, UNSPECIFIED: ICD-10-CM

## 2017-03-16 DIAGNOSIS — I50.9 HEART FAILURE, UNSPECIFIED: ICD-10-CM

## 2017-03-16 DIAGNOSIS — E03.9 HYPOTHYROIDISM, UNSPECIFIED: ICD-10-CM

## 2017-03-16 DIAGNOSIS — Z95.0 PRESENCE OF CARDIAC PACEMAKER: ICD-10-CM

## 2017-03-16 DIAGNOSIS — J96.11 CHRONIC RESPIRATORY FAILURE WITH HYPOXIA: ICD-10-CM

## 2017-03-16 DIAGNOSIS — J18.9 PNEUMONIA, UNSPECIFIED ORGANISM: ICD-10-CM

## 2017-03-16 DIAGNOSIS — I10 ESSENTIAL (PRIMARY) HYPERTENSION: ICD-10-CM

## 2017-03-16 DIAGNOSIS — J96.90 RESPIRATORY FAILURE, UNSPECIFIED, UNSPECIFIED WHETHER WITH HYPOXIA OR HYPERCAPNIA: ICD-10-CM

## 2017-03-16 DIAGNOSIS — I48.91 UNSPECIFIED ATRIAL FIBRILLATION: ICD-10-CM

## 2017-03-16 DIAGNOSIS — A41.9 SEPSIS, UNSPECIFIED ORGANISM: ICD-10-CM

## 2017-03-16 LAB
ALBUMIN SERPL ELPH-MCNC: 2.2 G/DL — LOW (ref 3.3–5)
ALBUMIN SERPL ELPH-MCNC: 2.2 G/DL — LOW (ref 3.3–5)
ALBUMIN SERPL ELPH-MCNC: 2.7 G/DL — LOW (ref 3.3–5)
ALP SERPL-CCNC: 114 U/L — SIGNIFICANT CHANGE UP (ref 40–120)
ALP SERPL-CCNC: 114 U/L — SIGNIFICANT CHANGE UP (ref 40–120)
ALP SERPL-CCNC: 142 U/L — HIGH (ref 40–120)
ALT FLD-CCNC: 15 U/L — SIGNIFICANT CHANGE UP (ref 12–78)
ALT FLD-CCNC: 15 U/L — SIGNIFICANT CHANGE UP (ref 12–78)
ALT FLD-CCNC: 19 U/L — SIGNIFICANT CHANGE UP (ref 12–78)
ANION GAP SERPL CALC-SCNC: 11 MMOL/L — SIGNIFICANT CHANGE UP (ref 5–17)
ANION GAP SERPL CALC-SCNC: 11 MMOL/L — SIGNIFICANT CHANGE UP (ref 5–17)
ANION GAP SERPL CALC-SCNC: 9 MMOL/L — SIGNIFICANT CHANGE UP (ref 5–17)
APPEARANCE UR: CLEAR — SIGNIFICANT CHANGE UP
APTT BLD: 37.1 SEC — SIGNIFICANT CHANGE UP (ref 27.5–37.4)
AST SERPL-CCNC: 25 U/L — SIGNIFICANT CHANGE UP (ref 15–37)
BACTERIA # UR AUTO: ABNORMAL
BASE EXCESS BLDA CALC-SCNC: 4.6 MMOL/L — HIGH (ref -2–2)
BASE EXCESS BLDA CALC-SCNC: 6.1 MMOL/L — HIGH (ref -2–2)
BILIRUB DIRECT SERPL-MCNC: 0.1 MG/DL — SIGNIFICANT CHANGE UP (ref 0.05–0.2)
BILIRUB INDIRECT FLD-MCNC: 0.4 MG/DL — SIGNIFICANT CHANGE UP (ref 0.2–1)
BILIRUB SERPL-MCNC: 0.4 MG/DL — SIGNIFICANT CHANGE UP (ref 0.2–1.2)
BILIRUB SERPL-MCNC: 0.5 MG/DL — SIGNIFICANT CHANGE UP (ref 0.2–1.2)
BILIRUB SERPL-MCNC: 0.5 MG/DL — SIGNIFICANT CHANGE UP (ref 0.2–1.2)
BILIRUB UR-MCNC: NEGATIVE — SIGNIFICANT CHANGE UP
BLOOD GAS COMMENTS ARTERIAL: SIGNIFICANT CHANGE UP
BUN SERPL-MCNC: 106 MG/DL — HIGH (ref 7–23)
BUN SERPL-MCNC: 108 MG/DL — HIGH (ref 7–23)
BUN SERPL-MCNC: 94 MG/DL — HIGH (ref 7–23)
CALCIUM SERPL-MCNC: 8 MG/DL — LOW (ref 8.5–10.1)
CALCIUM SERPL-MCNC: 8.5 MG/DL — SIGNIFICANT CHANGE UP (ref 8.5–10.1)
CALCIUM SERPL-MCNC: 8.5 MG/DL — SIGNIFICANT CHANGE UP (ref 8.5–10.1)
CHLORIDE SERPL-SCNC: 100 MMOL/L — SIGNIFICANT CHANGE UP (ref 96–108)
CHLORIDE SERPL-SCNC: 101 MMOL/L — SIGNIFICANT CHANGE UP (ref 96–108)
CHLORIDE SERPL-SCNC: 103 MMOL/L — SIGNIFICANT CHANGE UP (ref 96–108)
CK MB BLD-MCNC: 2.1 % — SIGNIFICANT CHANGE UP (ref 0–3.5)
CK MB BLD-MCNC: 2.1 % — SIGNIFICANT CHANGE UP (ref 0–3.5)
CK MB BLD-MCNC: 3.4 % — SIGNIFICANT CHANGE UP (ref 0–3.5)
CK MB CFR SERPL CALC: 1.4 NG/ML — SIGNIFICANT CHANGE UP (ref 0–3.6)
CK MB CFR SERPL CALC: 1.5 NG/ML — SIGNIFICANT CHANGE UP (ref 0–3.6)
CK MB CFR SERPL CALC: 2.2 NG/ML — SIGNIFICANT CHANGE UP (ref 0–3.6)
CK SERPL-CCNC: 65 U/L — SIGNIFICANT CHANGE UP (ref 26–192)
CK SERPL-CCNC: 67 U/L — SIGNIFICANT CHANGE UP (ref 26–192)
CK SERPL-CCNC: 72 U/L — SIGNIFICANT CHANGE UP (ref 26–192)
CO2 SERPL-SCNC: 29 MMOL/L — SIGNIFICANT CHANGE UP (ref 22–31)
CO2 SERPL-SCNC: 30 MMOL/L — SIGNIFICANT CHANGE UP (ref 22–31)
CO2 SERPL-SCNC: 33 MMOL/L — HIGH (ref 22–31)
COLOR SPEC: YELLOW — SIGNIFICANT CHANGE UP
CREAT ?TM UR-MCNC: 15 MG/DL — SIGNIFICANT CHANGE UP
CREAT SERPL-MCNC: 2.3 MG/DL — HIGH (ref 0.5–1.3)
CREAT SERPL-MCNC: 2.3 MG/DL — HIGH (ref 0.5–1.3)
CREAT SERPL-MCNC: 2.5 MG/DL — HIGH (ref 0.5–1.3)
DIFF PNL FLD: NEGATIVE — SIGNIFICANT CHANGE UP
DIGOXIN SERPL-MCNC: 0.5 NG/ML — LOW (ref 0.8–2)
EPI CELLS # UR: SIGNIFICANT CHANGE UP
GLUCOSE SERPL-MCNC: 127 MG/DL — HIGH (ref 70–99)
GLUCOSE SERPL-MCNC: 129 MG/DL — HIGH (ref 70–99)
GLUCOSE SERPL-MCNC: 145 MG/DL — HIGH (ref 70–99)
GLUCOSE UR QL: NEGATIVE — SIGNIFICANT CHANGE UP
HCO3 BLDA-SCNC: 28 MMOL/L — HIGH (ref 23–27)
HCO3 BLDA-SCNC: 30 MMOL/L — HIGH (ref 23–27)
HCT VFR BLD CALC: 30.5 % — LOW (ref 34.5–45)
HGB BLD-MCNC: 8.8 G/DL — LOW (ref 11.5–15.5)
HOROWITZ INDEX BLDA+IHG-RTO: 50 — SIGNIFICANT CHANGE UP
HOROWITZ INDEX BLDA+IHG-RTO: 50 — SIGNIFICANT CHANGE UP
INR BLD: 2.72 RATIO — HIGH (ref 0.88–1.16)
KETONES UR-MCNC: NEGATIVE — SIGNIFICANT CHANGE UP
LACTATE SERPL-SCNC: 0.6 MMOL/L — LOW (ref 0.7–2)
LACTATE SERPL-SCNC: 1 MMOL/L — SIGNIFICANT CHANGE UP (ref 0.7–2)
LEUKOCYTE ESTERASE UR-ACNC: ABNORMAL
MAGNESIUM SERPL-MCNC: 2.9 MG/DL — HIGH (ref 1.8–2.4)
MCHC RBC-ENTMCNC: 28.8 GM/DL — LOW (ref 32–36)
MCHC RBC-ENTMCNC: 29.5 PG — SIGNIFICANT CHANGE UP (ref 27–34)
MCV RBC AUTO: 102.4 FL — HIGH (ref 80–100)
NITRITE UR-MCNC: NEGATIVE — SIGNIFICANT CHANGE UP
NT-PROBNP SERPL-SCNC: HIGH PG/ML (ref 0–450)
NT-PROBNP SERPL-SCNC: HIGH PG/ML (ref 0–450)
PCO2 BLDA: 47 MMHG — HIGH (ref 32–46)
PCO2 BLDA: 74 MMHG — CRITICAL HIGH (ref 32–46)
PH BLDA: 7.25 — LOW (ref 7.35–7.45)
PH BLDA: 7.42 — SIGNIFICANT CHANGE UP (ref 7.35–7.45)
PH UR: 5 — SIGNIFICANT CHANGE UP (ref 4.8–8)
PHOSPHATE SERPL-MCNC: 5.3 MG/DL — HIGH (ref 2.5–4.5)
PLATELET # BLD AUTO: 251 K/UL — SIGNIFICANT CHANGE UP (ref 150–400)
PO2 BLDA: 126 MMHG — HIGH (ref 74–108)
PO2 BLDA: 65 MMHG — LOW (ref 74–108)
POTASSIUM SERPL-MCNC: 4.8 MMOL/L — SIGNIFICANT CHANGE UP (ref 3.5–5.3)
POTASSIUM SERPL-MCNC: 5.6 MMOL/L — HIGH (ref 3.5–5.3)
POTASSIUM SERPL-MCNC: 5.8 MMOL/L — HIGH (ref 3.5–5.3)
POTASSIUM SERPL-SCNC: 4.8 MMOL/L — SIGNIFICANT CHANGE UP (ref 3.5–5.3)
POTASSIUM SERPL-SCNC: 5.6 MMOL/L — HIGH (ref 3.5–5.3)
POTASSIUM SERPL-SCNC: 5.8 MMOL/L — HIGH (ref 3.5–5.3)
PROT SERPL-MCNC: 5.6 G/DL — LOW (ref 6–8.3)
PROT SERPL-MCNC: 5.6 G/DL — LOW (ref 6–8.3)
PROT SERPL-MCNC: 6.7 G/DL — SIGNIFICANT CHANGE UP (ref 6–8.3)
PROT UR-MCNC: NEGATIVE — SIGNIFICANT CHANGE UP
PROTHROM AB SERPL-ACNC: 30.5 SEC — HIGH (ref 10–13.1)
RBC # BLD: 2.98 M/UL — LOW (ref 3.8–5.2)
RBC # FLD: 18.8 % — HIGH (ref 10.3–14.5)
SAO2 % BLDA: 91 % — LOW (ref 92–96)
SAO2 % BLDA: 99 % — HIGH (ref 92–96)
SODIUM SERPL-SCNC: 141 MMOL/L — SIGNIFICANT CHANGE UP (ref 135–145)
SODIUM SERPL-SCNC: 142 MMOL/L — SIGNIFICANT CHANGE UP (ref 135–145)
SODIUM SERPL-SCNC: 144 MMOL/L — SIGNIFICANT CHANGE UP (ref 135–145)
SODIUM UR-SCNC: 86 MMOL/L — SIGNIFICANT CHANGE UP
SP GR SPEC: 1.01 — SIGNIFICANT CHANGE UP (ref 1.01–1.02)
TROPONIN I SERPL-MCNC: 0.1 NG/ML — HIGH (ref 0.01–0.04)
TROPONIN I SERPL-MCNC: 0.27 NG/ML — HIGH (ref 0.01–0.04)
TROPONIN I SERPL-MCNC: 0.28 NG/ML — HIGH (ref 0.01–0.04)
UROBILINOGEN FLD QL: NEGATIVE — SIGNIFICANT CHANGE UP
WBC # BLD: 9.3 K/UL — SIGNIFICANT CHANGE UP (ref 3.8–10.5)
WBC # FLD AUTO: 9.3 K/UL — SIGNIFICANT CHANGE UP (ref 3.8–10.5)
WBC UR QL: SIGNIFICANT CHANGE UP

## 2017-03-16 PROCEDURE — 71250 CT THORAX DX C-: CPT | Mod: 26

## 2017-03-16 PROCEDURE — 99291 CRITICAL CARE FIRST HOUR: CPT

## 2017-03-16 PROCEDURE — 93010 ELECTROCARDIOGRAM REPORT: CPT

## 2017-03-16 PROCEDURE — 93306 TTE W/DOPPLER COMPLETE: CPT | Mod: 26

## 2017-03-16 PROCEDURE — 76775 US EXAM ABDO BACK WALL LIM: CPT | Mod: 26

## 2017-03-16 RX ORDER — FUROSEMIDE 40 MG
40 TABLET ORAL
Qty: 0 | Refills: 0 | Status: DISCONTINUED | OUTPATIENT
Start: 2017-03-16 | End: 2017-03-16

## 2017-03-16 RX ORDER — FUROSEMIDE 40 MG
20 TABLET ORAL ONCE
Qty: 0 | Refills: 0 | Status: COMPLETED | OUTPATIENT
Start: 2017-03-16 | End: 2017-03-16

## 2017-03-16 RX ORDER — WARFARIN SODIUM 2.5 MG/1
1 TABLET ORAL ONCE
Qty: 0 | Refills: 0 | Status: COMPLETED | OUTPATIENT
Start: 2017-03-16 | End: 2017-03-16

## 2017-03-16 RX ORDER — LEVOTHYROXINE SODIUM 125 MCG
50 TABLET ORAL DAILY
Qty: 0 | Refills: 0 | Status: DISCONTINUED | OUTPATIENT
Start: 2017-03-16 | End: 2017-03-28

## 2017-03-16 RX ORDER — SODIUM POLYSTYRENE SULFONATE 4.1 MEQ/G
30 POWDER, FOR SUSPENSION ORAL ONCE
Qty: 0 | Refills: 0 | Status: COMPLETED | OUTPATIENT
Start: 2017-03-16 | End: 2017-03-16

## 2017-03-16 RX ORDER — MIDODRINE HYDROCHLORIDE 2.5 MG/1
10 TABLET ORAL EVERY 8 HOURS
Qty: 0 | Refills: 0 | Status: DISCONTINUED | OUTPATIENT
Start: 2017-03-16 | End: 2017-03-18

## 2017-03-16 RX ORDER — ASPIRIN/CALCIUM CARB/MAGNESIUM 324 MG
81 TABLET ORAL DAILY
Qty: 0 | Refills: 0 | Status: DISCONTINUED | OUTPATIENT
Start: 2017-03-16 | End: 2017-03-28

## 2017-03-16 RX ORDER — DIGOXIN 250 MCG
0.12 TABLET ORAL DAILY
Qty: 0 | Refills: 0 | Status: DISCONTINUED | OUTPATIENT
Start: 2017-03-16 | End: 2017-03-22

## 2017-03-16 RX ORDER — PIPERACILLIN AND TAZOBACTAM 4; .5 G/20ML; G/20ML
3.38 INJECTION, POWDER, LYOPHILIZED, FOR SOLUTION INTRAVENOUS EVERY 12 HOURS
Qty: 0 | Refills: 0 | Status: DISCONTINUED | OUTPATIENT
Start: 2017-03-16 | End: 2017-03-19

## 2017-03-16 RX ORDER — ATORVASTATIN CALCIUM 80 MG/1
40 TABLET, FILM COATED ORAL AT BEDTIME
Qty: 0 | Refills: 0 | Status: DISCONTINUED | OUTPATIENT
Start: 2017-03-16 | End: 2017-03-28

## 2017-03-16 RX ORDER — VANCOMYCIN HCL 1 G
1000 VIAL (EA) INTRAVENOUS ONCE
Qty: 0 | Refills: 0 | Status: COMPLETED | OUTPATIENT
Start: 2017-03-16 | End: 2017-03-16

## 2017-03-16 RX ORDER — NOREPINEPHRINE BITARTRATE/D5W 8 MG/250ML
0.3 PLASTIC BAG, INJECTION (ML) INTRAVENOUS
Qty: 8 | Refills: 0 | Status: DISCONTINUED | OUTPATIENT
Start: 2017-03-16 | End: 2017-03-17

## 2017-03-16 RX ORDER — PIPERACILLIN AND TAZOBACTAM 4; .5 G/20ML; G/20ML
3.38 INJECTION, POWDER, LYOPHILIZED, FOR SOLUTION INTRAVENOUS ONCE
Qty: 0 | Refills: 0 | Status: COMPLETED | OUTPATIENT
Start: 2017-03-16 | End: 2017-03-16

## 2017-03-16 RX ORDER — IPRATROPIUM/ALBUTEROL SULFATE 18-103MCG
3 AEROSOL WITH ADAPTER (GRAM) INHALATION EVERY 6 HOURS
Qty: 0 | Refills: 0 | Status: DISCONTINUED | OUTPATIENT
Start: 2017-03-16 | End: 2017-03-28

## 2017-03-16 RX ORDER — PANTOPRAZOLE SODIUM 20 MG/1
40 TABLET, DELAYED RELEASE ORAL DAILY
Qty: 0 | Refills: 0 | Status: DISCONTINUED | OUTPATIENT
Start: 2017-03-16 | End: 2017-03-28

## 2017-03-16 RX ADMIN — Medication 40.84 MICROGRAM(S)/KG/MIN: at 02:35

## 2017-03-16 RX ADMIN — Medication 60 MILLIGRAM(S): at 06:10

## 2017-03-16 RX ADMIN — Medication 3 MILLILITER(S): at 08:27

## 2017-03-16 RX ADMIN — Medication 20 MILLIGRAM(S): at 02:34

## 2017-03-16 RX ADMIN — Medication 3 MILLILITER(S): at 04:28

## 2017-03-16 RX ADMIN — Medication 3 MILLILITER(S): at 19:51

## 2017-03-16 RX ADMIN — Medication 125 MILLIGRAM(S): at 02:34

## 2017-03-16 RX ADMIN — SODIUM POLYSTYRENE SULFONATE 30 GRAM(S): 4.1 POWDER, FOR SUSPENSION ORAL at 14:00

## 2017-03-16 RX ADMIN — MIDODRINE HYDROCHLORIDE 10 MILLIGRAM(S): 2.5 TABLET ORAL at 22:43

## 2017-03-16 RX ADMIN — ATORVASTATIN CALCIUM 40 MILLIGRAM(S): 80 TABLET, FILM COATED ORAL at 22:42

## 2017-03-16 RX ADMIN — Medication 250 MILLIGRAM(S): at 06:36

## 2017-03-16 RX ADMIN — PIPERACILLIN AND TAZOBACTAM 200 GRAM(S): 4; .5 INJECTION, POWDER, LYOPHILIZED, FOR SOLUTION INTRAVENOUS at 02:35

## 2017-03-16 RX ADMIN — Medication 60 MILLIGRAM(S): at 22:43

## 2017-03-16 RX ADMIN — Medication 60 MILLIGRAM(S): at 14:26

## 2017-03-16 RX ADMIN — PIPERACILLIN AND TAZOBACTAM 25 GRAM(S): 4; .5 INJECTION, POWDER, LYOPHILIZED, FOR SOLUTION INTRAVENOUS at 14:26

## 2017-03-16 NOTE — ED ADULT NURSE REASSESSMENT NOTE - NS ED NURSE REASSESS COMMENT FT1
Spoke with Daughter Cait [phone]. Daughter stated that there is a DNR in place and she would like it to remain in place for her mother. Dr. Kumar made aware.

## 2017-03-16 NOTE — DIETITIAN INITIAL EVALUATION ADULT. - NS AS NUTRI DX OTHER
[id number] advance 10cc q8hrs to goal 890cc  30cc prosource BID for 1720kcal/70g protein, 250 cc free h2o flush q6hrs Rec diet if feasible per SLP eval, if unable to initiate, consider ng tube vs og [id number] advance 10cc q8hrs to goal 890cc 50cc x 18hrs + 30cc prosource BID for 1720kcal/70g protein, 200 cc free h2o flush TID

## 2017-03-16 NOTE — H&P ADULT. - COMMENTS
Pt non-verbal. Trach in place. Oxygen saturation on admittance 88%- oxygen supplemented and Respiratory Services called. . Pacemaker noted on upper left chest wall. Upper extremities + 2  weeping edema bilaterally.  unable to get a good ros

## 2017-03-16 NOTE — H&P ADULT. - REASON FOR ADMISSION
pateint is 83 year old with past medical Afib  ,  Anemia, unspecified type  ,  Bell's palsy  ,  CHF (congestive heart     failure)  ,   Chronic respiratory failure with hypoxia  ,  Depression  ,  Dysphagia, unspecified type      Gastrostomy in place    ,  GERD (gastroesophageal reflux disease)  ,  HTN (hypertension)  ,  Hypothyroid      Pacemaker  ,  PICC line infection, initial encounter  ,  PNA (pneumonia)  ,   Sepsis  ,  Stroke  ,  Tracheostomy in     place  ,   Uterine cancer  ,   Vocal cord paralysis syndrome  ,  H/O tracheostomy  ,  Mitral valve replaced      Status post right hip replacement  ,  Status post tracheostomy ,  came via  ems from SNF for resp distress. pt s/p     trach, nonverbal, unable to provide history with sig difficulty of breathing and sortness of breath ,

## 2017-03-16 NOTE — DIETITIAN INITIAL EVALUATION ADULT. - OTHER INFO
Pt intubated, lethargic. Dx CHF, resp failure. Pt not a candidate for education. Per RN, pt ate by mouth PTA, rec SLP eval. From LTC. 2+ edema Christiane noted. Per Molst, DNR, TF directive not addressed Stage I pressure ulcers noted

## 2017-03-16 NOTE — PROVIDER CONTACT NOTE (EICU) - ASSESSMENT
septic shock due to pna  acute on chronic resp failure no on vent  underlying afib cva dysphagia, CVA,

## 2017-03-16 NOTE — PROVIDER CONTACT NOTE (EICU) - BACKGROUND
83 year old female with afib with h/o cva on a/c, CHF, Dysphagia s/p peg, Gerd, HTN PPM, Hypothyroidism, s/p ppm, h/o chronic resp failure s/p trach, s/p MVR,  who was brought in from NH for sob, required connection to ventilator. Noted to require pressers.

## 2017-03-17 LAB
ANION GAP SERPL CALC-SCNC: 11 MMOL/L — SIGNIFICANT CHANGE UP (ref 5–17)
ANION GAP SERPL CALC-SCNC: 12 MMOL/L — SIGNIFICANT CHANGE UP (ref 5–17)
APTT BLD: 27.9 SEC — SIGNIFICANT CHANGE UP (ref 27.5–37.4)
BASOPHILS # BLD AUTO: 0 K/UL — SIGNIFICANT CHANGE UP (ref 0–0.2)
BASOPHILS NFR BLD AUTO: 0.2 % — SIGNIFICANT CHANGE UP (ref 0–2)
BUN SERPL-MCNC: 85 MG/DL — HIGH (ref 7–23)
BUN SERPL-MCNC: 95 MG/DL — HIGH (ref 7–23)
CALCIUM SERPL-MCNC: 8 MG/DL — LOW (ref 8.5–10.1)
CALCIUM SERPL-MCNC: 8.2 MG/DL — LOW (ref 8.5–10.1)
CHLORIDE SERPL-SCNC: 101 MMOL/L — SIGNIFICANT CHANGE UP (ref 96–108)
CHLORIDE SERPL-SCNC: 102 MMOL/L — SIGNIFICANT CHANGE UP (ref 96–108)
CO2 SERPL-SCNC: 33 MMOL/L — HIGH (ref 22–31)
CO2 SERPL-SCNC: 33 MMOL/L — HIGH (ref 22–31)
CREAT SERPL-MCNC: 2.2 MG/DL — HIGH (ref 0.5–1.3)
CREAT SERPL-MCNC: 2.2 MG/DL — HIGH (ref 0.5–1.3)
CULTURE RESULTS: NO GROWTH — SIGNIFICANT CHANGE UP
EOSINOPHIL # BLD AUTO: 0 K/UL — SIGNIFICANT CHANGE UP (ref 0–0.5)
EOSINOPHIL NFR BLD AUTO: 0 % — SIGNIFICANT CHANGE UP (ref 0–6)
GLUCOSE SERPL-MCNC: 144 MG/DL — HIGH (ref 70–99)
GLUCOSE SERPL-MCNC: 219 MG/DL — HIGH (ref 70–99)
HCT VFR BLD CALC: 26.4 % — LOW (ref 34.5–45)
HCT VFR BLD CALC: 34.8 % — SIGNIFICANT CHANGE UP (ref 34.5–45)
HGB BLD-MCNC: 10.9 G/DL — LOW (ref 11.5–15.5)
HGB BLD-MCNC: 7.7 G/DL — LOW (ref 11.5–15.5)
INR BLD: 1.23 RATIO — HIGH (ref 0.88–1.16)
INR BLD: 2.31 RATIO — HIGH (ref 0.88–1.16)
LYMPHOCYTES # BLD AUTO: 0.3 K/UL — LOW (ref 1–3.3)
LYMPHOCYTES # BLD AUTO: 4.5 % — LOW (ref 13–44)
MAGNESIUM SERPL-MCNC: 2.8 MG/DL — HIGH (ref 1.8–2.4)
MCHC RBC-ENTMCNC: 29.2 GM/DL — LOW (ref 32–36)
MCHC RBC-ENTMCNC: 29.2 PG — SIGNIFICANT CHANGE UP (ref 27–34)
MCHC RBC-ENTMCNC: 30.3 PG — SIGNIFICANT CHANGE UP (ref 27–34)
MCHC RBC-ENTMCNC: 31.3 GM/DL — LOW (ref 32–36)
MCV RBC AUTO: 100.2 FL — HIGH (ref 80–100)
MCV RBC AUTO: 96.8 FL — SIGNIFICANT CHANGE UP (ref 80–100)
MONOCYTES # BLD AUTO: 0.1 K/UL — SIGNIFICANT CHANGE UP (ref 0–0.9)
MONOCYTES NFR BLD AUTO: 1.8 % — SIGNIFICANT CHANGE UP (ref 1–9)
NEUTROPHILS # BLD AUTO: 5.9 K/UL — SIGNIFICANT CHANGE UP (ref 1.8–7.4)
NEUTROPHILS NFR BLD AUTO: 93.5 % — HIGH (ref 43–77)
PHOSPHATE SERPL-MCNC: 5.1 MG/DL — HIGH (ref 2.5–4.5)
PLATELET # BLD AUTO: 173 K/UL — SIGNIFICANT CHANGE UP (ref 150–400)
PLATELET # BLD AUTO: 186 K/UL — SIGNIFICANT CHANGE UP (ref 150–400)
POTASSIUM SERPL-MCNC: 4 MMOL/L — SIGNIFICANT CHANGE UP (ref 3.5–5.3)
POTASSIUM SERPL-MCNC: 4.2 MMOL/L — SIGNIFICANT CHANGE UP (ref 3.5–5.3)
POTASSIUM SERPL-SCNC: 4 MMOL/L — SIGNIFICANT CHANGE UP (ref 3.5–5.3)
POTASSIUM SERPL-SCNC: 4.2 MMOL/L — SIGNIFICANT CHANGE UP (ref 3.5–5.3)
PROTHROM AB SERPL-ACNC: 13.7 SEC — HIGH (ref 10–13.1)
PROTHROM AB SERPL-ACNC: 25.9 SEC — HIGH (ref 10–13.1)
RBC # BLD: 2.63 M/UL — LOW (ref 3.8–5.2)
RBC # BLD: 3.6 M/UL — LOW (ref 3.8–5.2)
RBC # FLD: 17.8 % — HIGH (ref 10.3–14.5)
RBC # FLD: 19 % — HIGH (ref 10.3–14.5)
SODIUM SERPL-SCNC: 145 MMOL/L — SIGNIFICANT CHANGE UP (ref 135–145)
SODIUM SERPL-SCNC: 147 MMOL/L — HIGH (ref 135–145)
SPECIMEN SOURCE: SIGNIFICANT CHANGE UP
VANCOMYCIN TROUGH SERPL-MCNC: 9.1 UG/ML — LOW (ref 10–20)
WBC # BLD: 6.3 K/UL — SIGNIFICANT CHANGE UP (ref 3.8–10.5)
WBC # BLD: 9 K/UL — SIGNIFICANT CHANGE UP (ref 3.8–10.5)
WBC # FLD AUTO: 6.3 K/UL — SIGNIFICANT CHANGE UP (ref 3.8–10.5)
WBC # FLD AUTO: 9 K/UL — SIGNIFICANT CHANGE UP (ref 3.8–10.5)

## 2017-03-17 PROCEDURE — 99291 CRITICAL CARE FIRST HOUR: CPT

## 2017-03-17 PROCEDURE — 71010: CPT | Mod: 26

## 2017-03-17 RX ORDER — WARFARIN SODIUM 2.5 MG/1
1 TABLET ORAL ONCE
Qty: 0 | Refills: 0 | Status: DISCONTINUED | OUTPATIENT
Start: 2017-03-17 | End: 2017-03-17

## 2017-03-17 RX ORDER — VANCOMYCIN HCL 1 G
1000 VIAL (EA) INTRAVENOUS EVERY 24 HOURS
Qty: 0 | Refills: 0 | Status: DISCONTINUED | OUTPATIENT
Start: 2017-03-18 | End: 2017-03-22

## 2017-03-17 RX ORDER — VANCOMYCIN HCL 1 G
1000 VIAL (EA) INTRAVENOUS ONCE
Qty: 0 | Refills: 0 | Status: COMPLETED | OUTPATIENT
Start: 2017-03-17 | End: 2017-03-17

## 2017-03-17 RX ORDER — VANCOMYCIN HCL 1 G
VIAL (EA) INTRAVENOUS
Qty: 0 | Refills: 0 | Status: DISCONTINUED | OUTPATIENT
Start: 2017-03-17 | End: 2017-03-22

## 2017-03-17 RX ORDER — DESMOPRESSIN ACETATE 0.1 MG/1
20 TABLET ORAL ONCE
Qty: 0 | Refills: 0 | Status: COMPLETED | OUTPATIENT
Start: 2017-03-17 | End: 2017-03-17

## 2017-03-17 RX ORDER — NOREPINEPHRINE BITARTRATE/D5W 8 MG/250ML
1 PLASTIC BAG, INJECTION (ML) INTRAVENOUS
Qty: 8 | Refills: 0 | Status: DISCONTINUED | OUTPATIENT
Start: 2017-03-17 | End: 2017-03-18

## 2017-03-17 RX ORDER — PHYTONADIONE (VIT K1) 5 MG
10 TABLET ORAL ONCE
Qty: 0 | Refills: 0 | Status: COMPLETED | OUTPATIENT
Start: 2017-03-17 | End: 2017-03-17

## 2017-03-17 RX ADMIN — Medication 81 MILLIGRAM(S): at 12:12

## 2017-03-17 RX ADMIN — Medication 0.12 MILLIGRAM(S): at 06:01

## 2017-03-17 RX ADMIN — Medication 3 MILLILITER(S): at 07:59

## 2017-03-17 RX ADMIN — Medication 60 MILLIGRAM(S): at 06:01

## 2017-03-17 RX ADMIN — Medication 102 MILLIGRAM(S): at 15:15

## 2017-03-17 RX ADMIN — MIDODRINE HYDROCHLORIDE 10 MILLIGRAM(S): 2.5 TABLET ORAL at 14:40

## 2017-03-17 RX ADMIN — ATORVASTATIN CALCIUM 40 MILLIGRAM(S): 80 TABLET, FILM COATED ORAL at 22:37

## 2017-03-17 RX ADMIN — Medication 3 MILLILITER(S): at 13:46

## 2017-03-17 RX ADMIN — Medication 50 MICROGRAM(S): at 06:01

## 2017-03-17 RX ADMIN — PIPERACILLIN AND TAZOBACTAM 25 GRAM(S): 4; .5 INJECTION, POWDER, LYOPHILIZED, FOR SOLUTION INTRAVENOUS at 14:40

## 2017-03-17 RX ADMIN — MIDODRINE HYDROCHLORIDE 10 MILLIGRAM(S): 2.5 TABLET ORAL at 22:37

## 2017-03-17 RX ADMIN — Medication 3 MILLILITER(S): at 02:39

## 2017-03-17 RX ADMIN — PANTOPRAZOLE SODIUM 40 MILLIGRAM(S): 20 TABLET, DELAYED RELEASE ORAL at 12:12

## 2017-03-17 RX ADMIN — DESMOPRESSIN ACETATE 220 MICROGRAM(S): 0.1 TABLET ORAL at 15:30

## 2017-03-17 RX ADMIN — Medication 3 MILLILITER(S): at 19:48

## 2017-03-17 RX ADMIN — MIDODRINE HYDROCHLORIDE 10 MILLIGRAM(S): 2.5 TABLET ORAL at 06:01

## 2017-03-17 RX ADMIN — Medication 5 MILLIGRAM(S): at 12:12

## 2017-03-17 RX ADMIN — Medication 250 MILLIGRAM(S): at 14:39

## 2017-03-17 RX ADMIN — PIPERACILLIN AND TAZOBACTAM 25 GRAM(S): 4; .5 INJECTION, POWDER, LYOPHILIZED, FOR SOLUTION INTRAVENOUS at 01:16

## 2017-03-17 NOTE — GOALS OF CARE CONVERSATION - PERSONAL ADVANCE DIRECTIVE - CONVERSATION DETAILS
contacted Cait, daughter on phone, copy of molst present from 2/16, need to update form, Cait will be visiting this evening to discuss further w pt. Nursing aware, contact # given

## 2017-03-17 NOTE — SWALLOW BEDSIDE ASSESSMENT ADULT - ASR SWALLOW ASPIRATION MONITOR
position upright (90Y)/change of breathing pattern/throat clearing/oral hygiene/gurgly voice/pneumonia/cough/fever/upper respiratory infection

## 2017-03-17 NOTE — SWALLOW BEDSIDE ASSESSMENT ADULT - PHARYNGEAL PHASE
Decreased laryngeal elevation/Delayed pharyngeal swallow Delayed cough post oral intake/Decreased laryngeal elevation/Delayed pharyngeal swallow/Multiple swallows

## 2017-03-17 NOTE — SWALLOW BEDSIDE ASSESSMENT ADULT - COMMENTS
84 y/o woman with h/o trach admitted with respiratory distress, seen for clinical swallow eval. Pt with oral-pharyngeal dysphagia with reduced oral prep (pt reports dentures at SNF), latent A-P transport and suspected swallow delay, laryngeal excursion reduced conistent with trach collar. Pt with multiple swallows and delayed cough with thin liquids. Multiple swallows and delayed cough with thin liquids only

## 2017-03-18 LAB
ANION GAP SERPL CALC-SCNC: 12 MMOL/L — SIGNIFICANT CHANGE UP (ref 5–17)
APTT BLD: 26.6 SEC — LOW (ref 27.5–37.4)
BASOPHILS # BLD AUTO: 0 K/UL — SIGNIFICANT CHANGE UP (ref 0–0.2)
BASOPHILS NFR BLD AUTO: 0.1 % — SIGNIFICANT CHANGE UP (ref 0–2)
BUN SERPL-MCNC: 85 MG/DL — HIGH (ref 7–23)
CALCIUM SERPL-MCNC: 8.3 MG/DL — LOW (ref 8.5–10.1)
CHLORIDE SERPL-SCNC: 103 MMOL/L — SIGNIFICANT CHANGE UP (ref 96–108)
CO2 SERPL-SCNC: 34 MMOL/L — HIGH (ref 22–31)
CREAT SERPL-MCNC: 2 MG/DL — HIGH (ref 0.5–1.3)
EOSINOPHIL # BLD AUTO: 0 K/UL — SIGNIFICANT CHANGE UP (ref 0–0.5)
EOSINOPHIL NFR BLD AUTO: 0 % — SIGNIFICANT CHANGE UP (ref 0–6)
GLUCOSE SERPL-MCNC: 165 MG/DL — HIGH (ref 70–99)
HCT VFR BLD CALC: 30.7 % — LOW (ref 34.5–45)
HGB BLD-MCNC: 9.4 G/DL — LOW (ref 11.5–15.5)
INR BLD: 1.08 RATIO — SIGNIFICANT CHANGE UP (ref 0.88–1.16)
LYMPHOCYTES # BLD AUTO: 0.2 K/UL — LOW (ref 1–3.3)
LYMPHOCYTES # BLD AUTO: 2.2 % — LOW (ref 13–44)
MAGNESIUM SERPL-MCNC: 2.5 MG/DL — HIGH (ref 1.8–2.4)
MCHC RBC-ENTMCNC: 29.6 PG — SIGNIFICANT CHANGE UP (ref 27–34)
MCHC RBC-ENTMCNC: 30.6 GM/DL — LOW (ref 32–36)
MCV RBC AUTO: 96.5 FL — SIGNIFICANT CHANGE UP (ref 80–100)
MONOCYTES # BLD AUTO: 0.3 K/UL — SIGNIFICANT CHANGE UP (ref 0–0.9)
MONOCYTES NFR BLD AUTO: 3.4 % — SIGNIFICANT CHANGE UP (ref 1–9)
NEUTROPHILS # BLD AUTO: 8.4 K/UL — HIGH (ref 1.8–7.4)
NEUTROPHILS NFR BLD AUTO: 94.3 % — HIGH (ref 43–77)
PHOSPHATE SERPL-MCNC: 3.7 MG/DL — SIGNIFICANT CHANGE UP (ref 2.5–4.5)
PLATELET # BLD AUTO: 140 K/UL — LOW (ref 150–400)
POTASSIUM SERPL-MCNC: 3.3 MMOL/L — LOW (ref 3.5–5.3)
POTASSIUM SERPL-SCNC: 3.3 MMOL/L — LOW (ref 3.5–5.3)
PROCALCITONIN SERPL-MCNC: 0.72 NG/ML — HIGH (ref 0–0.05)
PROTHROM AB SERPL-ACNC: 12 SEC — SIGNIFICANT CHANGE UP (ref 10–13.1)
RBC # BLD: 3.18 M/UL — LOW (ref 3.8–5.2)
RBC # FLD: 17.9 % — HIGH (ref 10.3–14.5)
SODIUM SERPL-SCNC: 149 MMOL/L — HIGH (ref 135–145)
VANCOMYCIN TROUGH SERPL-MCNC: 7.9 UG/ML — LOW (ref 10–20)
WBC # BLD: 8.9 K/UL — SIGNIFICANT CHANGE UP (ref 3.8–10.5)
WBC # FLD AUTO: 8.9 K/UL — SIGNIFICANT CHANGE UP (ref 3.8–10.5)

## 2017-03-18 PROCEDURE — 99291 CRITICAL CARE FIRST HOUR: CPT

## 2017-03-18 RX ORDER — GLUCAGON INJECTION, SOLUTION 0.5 MG/.1ML
1 INJECTION, SOLUTION SUBCUTANEOUS ONCE
Qty: 0 | Refills: 0 | Status: DISCONTINUED | OUTPATIENT
Start: 2017-03-18 | End: 2017-03-20

## 2017-03-18 RX ORDER — POTASSIUM CHLORIDE 20 MEQ
20 PACKET (EA) ORAL ONCE
Qty: 0 | Refills: 0 | Status: DISCONTINUED | OUTPATIENT
Start: 2017-03-18 | End: 2017-03-18

## 2017-03-18 RX ORDER — INSULIN LISPRO 100/ML
VIAL (ML) SUBCUTANEOUS AT BEDTIME
Qty: 0 | Refills: 0 | Status: DISCONTINUED | OUTPATIENT
Start: 2017-03-18 | End: 2017-03-20

## 2017-03-18 RX ORDER — SODIUM CHLORIDE 9 MG/ML
1000 INJECTION, SOLUTION INTRAVENOUS
Qty: 0 | Refills: 0 | Status: DISCONTINUED | OUTPATIENT
Start: 2017-03-18 | End: 2017-03-20

## 2017-03-18 RX ORDER — ENOXAPARIN SODIUM 100 MG/ML
30 INJECTION SUBCUTANEOUS DAILY
Qty: 0 | Refills: 0 | Status: DISCONTINUED | OUTPATIENT
Start: 2017-03-18 | End: 2017-03-20

## 2017-03-18 RX ORDER — SODIUM CHLORIDE 9 MG/ML
1000 INJECTION, SOLUTION INTRAVENOUS
Qty: 0 | Refills: 0 | Status: DISCONTINUED | OUTPATIENT
Start: 2017-03-18 | End: 2017-03-19

## 2017-03-18 RX ORDER — INSULIN LISPRO 100/ML
VIAL (ML) SUBCUTANEOUS
Qty: 0 | Refills: 0 | Status: DISCONTINUED | OUTPATIENT
Start: 2017-03-18 | End: 2017-03-20

## 2017-03-18 RX ORDER — DEXTROSE 50 % IN WATER 50 %
12.5 SYRINGE (ML) INTRAVENOUS ONCE
Qty: 0 | Refills: 0 | Status: DISCONTINUED | OUTPATIENT
Start: 2017-03-18 | End: 2017-03-20

## 2017-03-18 RX ORDER — DEXTROSE 50 % IN WATER 50 %
1 SYRINGE (ML) INTRAVENOUS ONCE
Qty: 0 | Refills: 0 | Status: DISCONTINUED | OUTPATIENT
Start: 2017-03-18 | End: 2017-03-20

## 2017-03-18 RX ORDER — DEXTROSE 50 % IN WATER 50 %
25 SYRINGE (ML) INTRAVENOUS ONCE
Qty: 0 | Refills: 0 | Status: DISCONTINUED | OUTPATIENT
Start: 2017-03-18 | End: 2017-03-20

## 2017-03-18 RX ORDER — MIDODRINE HYDROCHLORIDE 2.5 MG/1
5 TABLET ORAL EVERY 8 HOURS
Qty: 0 | Refills: 0 | Status: DISCONTINUED | OUTPATIENT
Start: 2017-03-18 | End: 2017-03-19

## 2017-03-18 RX ORDER — POTASSIUM CHLORIDE 20 MEQ
20 PACKET (EA) ORAL ONCE
Qty: 0 | Refills: 0 | Status: COMPLETED | OUTPATIENT
Start: 2017-03-18 | End: 2017-03-18

## 2017-03-18 RX ADMIN — Medication 20 MILLIEQUIVALENT(S): at 12:07

## 2017-03-18 RX ADMIN — MIDODRINE HYDROCHLORIDE 10 MILLIGRAM(S): 2.5 TABLET ORAL at 05:53

## 2017-03-18 RX ADMIN — PIPERACILLIN AND TAZOBACTAM 25 GRAM(S): 4; .5 INJECTION, POWDER, LYOPHILIZED, FOR SOLUTION INTRAVENOUS at 01:47

## 2017-03-18 RX ADMIN — Medication 250 MILLIGRAM(S): at 10:47

## 2017-03-18 RX ADMIN — Medication 50 MICROGRAM(S): at 05:53

## 2017-03-18 RX ADMIN — MIDODRINE HYDROCHLORIDE 5 MILLIGRAM(S): 2.5 TABLET ORAL at 14:14

## 2017-03-18 RX ADMIN — MIDODRINE HYDROCHLORIDE 5 MILLIGRAM(S): 2.5 TABLET ORAL at 21:46

## 2017-03-18 RX ADMIN — Medication 81 MILLIGRAM(S): at 12:07

## 2017-03-18 RX ADMIN — Medication 1: at 12:43

## 2017-03-18 RX ADMIN — SODIUM CHLORIDE 50 MILLILITER(S): 9 INJECTION, SOLUTION INTRAVENOUS at 10:46

## 2017-03-18 RX ADMIN — ATORVASTATIN CALCIUM 40 MILLIGRAM(S): 80 TABLET, FILM COATED ORAL at 21:46

## 2017-03-18 RX ADMIN — Medication 3 MILLILITER(S): at 02:05

## 2017-03-18 RX ADMIN — Medication 3 MILLILITER(S): at 19:41

## 2017-03-18 RX ADMIN — ENOXAPARIN SODIUM 30 MILLIGRAM(S): 100 INJECTION SUBCUTANEOUS at 12:07

## 2017-03-18 RX ADMIN — Medication 3 MILLILITER(S): at 08:38

## 2017-03-18 RX ADMIN — PANTOPRAZOLE SODIUM 40 MILLIGRAM(S): 20 TABLET, DELAYED RELEASE ORAL at 12:07

## 2017-03-18 RX ADMIN — Medication 0.12 MILLIGRAM(S): at 05:53

## 2017-03-18 RX ADMIN — Medication 5 MILLIGRAM(S): at 05:53

## 2017-03-18 RX ADMIN — PIPERACILLIN AND TAZOBACTAM 25 GRAM(S): 4; .5 INJECTION, POWDER, LYOPHILIZED, FOR SOLUTION INTRAVENOUS at 14:14

## 2017-03-18 RX ADMIN — Medication 1: at 17:39

## 2017-03-18 RX ADMIN — Medication 3 MILLILITER(S): at 13:54

## 2017-03-19 LAB
ANION GAP SERPL CALC-SCNC: 10 MMOL/L — SIGNIFICANT CHANGE UP (ref 5–17)
BUN SERPL-MCNC: 70 MG/DL — HIGH (ref 7–23)
CALCIUM SERPL-MCNC: 8.3 MG/DL — LOW (ref 8.5–10.1)
CHLORIDE SERPL-SCNC: 101 MMOL/L — SIGNIFICANT CHANGE UP (ref 96–108)
CO2 SERPL-SCNC: 34 MMOL/L — HIGH (ref 22–31)
CREAT SERPL-MCNC: 1.8 MG/DL — HIGH (ref 0.5–1.3)
GLUCOSE SERPL-MCNC: 162 MG/DL — HIGH (ref 70–99)
HCT VFR BLD CALC: 31.6 % — LOW (ref 34.5–45)
HGB BLD-MCNC: 9.8 G/DL — LOW (ref 11.5–15.5)
INR BLD: 1 RATIO — SIGNIFICANT CHANGE UP (ref 0.88–1.16)
MAGNESIUM SERPL-MCNC: 2.8 MG/DL — HIGH (ref 1.8–2.4)
MCHC RBC-ENTMCNC: 30.1 PG — SIGNIFICANT CHANGE UP (ref 27–34)
MCHC RBC-ENTMCNC: 31 GM/DL — LOW (ref 32–36)
MCV RBC AUTO: 96.9 FL — SIGNIFICANT CHANGE UP (ref 80–100)
PHOSPHATE SERPL-MCNC: 3.3 MG/DL — SIGNIFICANT CHANGE UP (ref 2.5–4.5)
PLATELET # BLD AUTO: 116 K/UL — LOW (ref 150–400)
POTASSIUM SERPL-MCNC: 3.5 MMOL/L — SIGNIFICANT CHANGE UP (ref 3.5–5.3)
POTASSIUM SERPL-SCNC: 3.5 MMOL/L — SIGNIFICANT CHANGE UP (ref 3.5–5.3)
PROTHROM AB SERPL-ACNC: 11.1 SEC — SIGNIFICANT CHANGE UP (ref 10–13.1)
RBC # BLD: 3.26 M/UL — LOW (ref 3.8–5.2)
RBC # FLD: 17.2 % — HIGH (ref 10.3–14.5)
SODIUM SERPL-SCNC: 145 MMOL/L — SIGNIFICANT CHANGE UP (ref 135–145)
WBC # BLD: 9.8 K/UL — SIGNIFICANT CHANGE UP (ref 3.8–10.5)
WBC # FLD AUTO: 9.8 K/UL — SIGNIFICANT CHANGE UP (ref 3.8–10.5)

## 2017-03-19 PROCEDURE — 99233 SBSQ HOSP IP/OBS HIGH 50: CPT

## 2017-03-19 RX ORDER — WARFARIN SODIUM 2.5 MG/1
2.5 TABLET ORAL ONCE
Qty: 0 | Refills: 0 | Status: COMPLETED | OUTPATIENT
Start: 2017-03-19 | End: 2017-03-19

## 2017-03-19 RX ORDER — FUROSEMIDE 40 MG
40 TABLET ORAL DAILY
Qty: 0 | Refills: 0 | Status: DISCONTINUED | OUTPATIENT
Start: 2017-03-19 | End: 2017-03-22

## 2017-03-19 RX ORDER — BUDESONIDE, MICRONIZED 100 %
0.5 POWDER (GRAM) MISCELLANEOUS
Qty: 0 | Refills: 0 | Status: DISCONTINUED | OUTPATIENT
Start: 2017-03-19 | End: 2017-03-28

## 2017-03-19 RX ORDER — PIPERACILLIN AND TAZOBACTAM 4; .5 G/20ML; G/20ML
3.38 INJECTION, POWDER, LYOPHILIZED, FOR SOLUTION INTRAVENOUS EVERY 8 HOURS
Qty: 0 | Refills: 0 | Status: COMPLETED | OUTPATIENT
Start: 2017-03-19 | End: 2017-03-22

## 2017-03-19 RX ORDER — POTASSIUM CHLORIDE 20 MEQ
20 PACKET (EA) ORAL ONCE
Qty: 0 | Refills: 0 | Status: COMPLETED | OUTPATIENT
Start: 2017-03-19 | End: 2017-03-19

## 2017-03-19 RX ORDER — POTASSIUM CHLORIDE 20 MEQ
20 PACKET (EA) ORAL ONCE
Qty: 0 | Refills: 0 | Status: DISCONTINUED | OUTPATIENT
Start: 2017-03-19 | End: 2017-03-19

## 2017-03-19 RX ADMIN — PIPERACILLIN AND TAZOBACTAM 25 GRAM(S): 4; .5 INJECTION, POWDER, LYOPHILIZED, FOR SOLUTION INTRAVENOUS at 11:39

## 2017-03-19 RX ADMIN — ENOXAPARIN SODIUM 30 MILLIGRAM(S): 100 INJECTION SUBCUTANEOUS at 11:39

## 2017-03-19 RX ADMIN — Medication 5 MILLIGRAM(S): at 05:30

## 2017-03-19 RX ADMIN — Medication 3 MILLILITER(S): at 19:50

## 2017-03-19 RX ADMIN — Medication 3 MILLILITER(S): at 14:00

## 2017-03-19 RX ADMIN — Medication 81 MILLIGRAM(S): at 11:40

## 2017-03-19 RX ADMIN — PANTOPRAZOLE SODIUM 40 MILLIGRAM(S): 20 TABLET, DELAYED RELEASE ORAL at 11:59

## 2017-03-19 RX ADMIN — Medication 0.5 MILLIGRAM(S): at 19:50

## 2017-03-19 RX ADMIN — Medication 0.12 MILLIGRAM(S): at 05:30

## 2017-03-19 RX ADMIN — Medication 3 MILLILITER(S): at 00:33

## 2017-03-19 RX ADMIN — Medication 1: at 11:48

## 2017-03-19 RX ADMIN — Medication 3 MILLILITER(S): at 08:00

## 2017-03-19 RX ADMIN — Medication 50 MICROGRAM(S): at 05:30

## 2017-03-19 RX ADMIN — MIDODRINE HYDROCHLORIDE 5 MILLIGRAM(S): 2.5 TABLET ORAL at 05:30

## 2017-03-19 RX ADMIN — Medication 40 MILLIGRAM(S): at 11:39

## 2017-03-19 RX ADMIN — PIPERACILLIN AND TAZOBACTAM 25 GRAM(S): 4; .5 INJECTION, POWDER, LYOPHILIZED, FOR SOLUTION INTRAVENOUS at 18:25

## 2017-03-19 RX ADMIN — Medication 250 MILLIGRAM(S): at 10:25

## 2017-03-19 RX ADMIN — Medication 20 MILLIEQUIVALENT(S): at 10:31

## 2017-03-19 RX ADMIN — ATORVASTATIN CALCIUM 40 MILLIGRAM(S): 80 TABLET, FILM COATED ORAL at 22:49

## 2017-03-19 RX ADMIN — Medication 1: at 09:04

## 2017-03-19 RX ADMIN — PIPERACILLIN AND TAZOBACTAM 25 GRAM(S): 4; .5 INJECTION, POWDER, LYOPHILIZED, FOR SOLUTION INTRAVENOUS at 02:08

## 2017-03-19 RX ADMIN — WARFARIN SODIUM 2.5 MILLIGRAM(S): 2.5 TABLET ORAL at 22:49

## 2017-03-20 LAB
ANION GAP SERPL CALC-SCNC: 9 MMOL/L — SIGNIFICANT CHANGE UP (ref 5–17)
APTT BLD: 29 SEC — SIGNIFICANT CHANGE UP (ref 27.5–37.4)
BUN SERPL-MCNC: 54 MG/DL — HIGH (ref 7–23)
CALCIUM SERPL-MCNC: 8.4 MG/DL — LOW (ref 8.5–10.1)
CHLORIDE SERPL-SCNC: 102 MMOL/L — SIGNIFICANT CHANGE UP (ref 96–108)
CO2 SERPL-SCNC: 35 MMOL/L — HIGH (ref 22–31)
CREAT SERPL-MCNC: 1.6 MG/DL — HIGH (ref 0.5–1.3)
DIGOXIN SERPL-MCNC: 0.8 NG/ML — SIGNIFICANT CHANGE UP (ref 0.8–2)
GLUCOSE SERPL-MCNC: 94 MG/DL — SIGNIFICANT CHANGE UP (ref 70–99)
HCT VFR BLD CALC: 30.6 % — LOW (ref 34.5–45)
HGB BLD-MCNC: 9.8 G/DL — LOW (ref 11.5–15.5)
INR BLD: 1.12 RATIO — SIGNIFICANT CHANGE UP (ref 0.88–1.16)
MCHC RBC-ENTMCNC: 30.9 PG — SIGNIFICANT CHANGE UP (ref 27–34)
MCHC RBC-ENTMCNC: 32.2 GM/DL — SIGNIFICANT CHANGE UP (ref 32–36)
MCV RBC AUTO: 96 FL — SIGNIFICANT CHANGE UP (ref 80–100)
PLATELET # BLD AUTO: 123 K/UL — LOW (ref 150–400)
POTASSIUM SERPL-MCNC: 3.3 MMOL/L — LOW (ref 3.5–5.3)
POTASSIUM SERPL-SCNC: 3.3 MMOL/L — LOW (ref 3.5–5.3)
PROTHROM AB SERPL-ACNC: 12.5 SEC — SIGNIFICANT CHANGE UP (ref 10–13.1)
RBC # BLD: 3.19 M/UL — LOW (ref 3.8–5.2)
RBC # FLD: 17.4 % — HIGH (ref 10.3–14.5)
SODIUM SERPL-SCNC: 146 MMOL/L — HIGH (ref 135–145)
VANCOMYCIN TROUGH SERPL-MCNC: 18.6 UG/ML — SIGNIFICANT CHANGE UP (ref 10–20)
WBC # BLD: 9.5 K/UL — SIGNIFICANT CHANGE UP (ref 3.8–10.5)
WBC # FLD AUTO: 9.5 K/UL — SIGNIFICANT CHANGE UP (ref 3.8–10.5)

## 2017-03-20 PROCEDURE — 99233 SBSQ HOSP IP/OBS HIGH 50: CPT | Mod: GC

## 2017-03-20 RX ORDER — POTASSIUM CHLORIDE 20 MEQ
40 PACKET (EA) ORAL EVERY 4 HOURS
Qty: 0 | Refills: 0 | Status: DISCONTINUED | OUTPATIENT
Start: 2017-03-20 | End: 2017-03-20

## 2017-03-20 RX ORDER — DOCUSATE SODIUM 100 MG
100 CAPSULE ORAL
Qty: 0 | Refills: 0 | Status: DISCONTINUED | OUTPATIENT
Start: 2017-03-20 | End: 2017-03-28

## 2017-03-20 RX ORDER — POTASSIUM CHLORIDE 20 MEQ
40 PACKET (EA) ORAL ONCE
Qty: 0 | Refills: 0 | Status: COMPLETED | OUTPATIENT
Start: 2017-03-20 | End: 2017-03-20

## 2017-03-20 RX ORDER — POLYETHYLENE GLYCOL 3350 17 G/17G
17 POWDER, FOR SOLUTION ORAL AT BEDTIME
Qty: 0 | Refills: 0 | Status: DISCONTINUED | OUTPATIENT
Start: 2017-03-20 | End: 2017-03-28

## 2017-03-20 RX ORDER — SENNA PLUS 8.6 MG/1
2 TABLET ORAL AT BEDTIME
Qty: 0 | Refills: 0 | Status: DISCONTINUED | OUTPATIENT
Start: 2017-03-20 | End: 2017-03-28

## 2017-03-20 RX ORDER — POTASSIUM CHLORIDE 20 MEQ
40 PACKET (EA) ORAL EVERY 4 HOURS
Qty: 0 | Refills: 0 | Status: COMPLETED | OUTPATIENT
Start: 2017-03-20 | End: 2017-03-20

## 2017-03-20 RX ORDER — WARFARIN SODIUM 2.5 MG/1
1 TABLET ORAL ONCE
Qty: 0 | Refills: 0 | Status: COMPLETED | OUTPATIENT
Start: 2017-03-20 | End: 2017-03-20

## 2017-03-20 RX ORDER — ENOXAPARIN SODIUM 100 MG/ML
60 INJECTION SUBCUTANEOUS DAILY
Qty: 0 | Refills: 0 | Status: DISCONTINUED | OUTPATIENT
Start: 2017-03-20 | End: 2017-03-23

## 2017-03-20 RX ADMIN — Medication 3 MILLILITER(S): at 20:11

## 2017-03-20 RX ADMIN — Medication 250 MILLIGRAM(S): at 10:49

## 2017-03-20 RX ADMIN — Medication 100 MILLIGRAM(S): at 17:58

## 2017-03-20 RX ADMIN — Medication 81 MILLIGRAM(S): at 11:25

## 2017-03-20 RX ADMIN — PIPERACILLIN AND TAZOBACTAM 25 GRAM(S): 4; .5 INJECTION, POWDER, LYOPHILIZED, FOR SOLUTION INTRAVENOUS at 05:22

## 2017-03-20 RX ADMIN — Medication 0.5 MILLIGRAM(S): at 20:11

## 2017-03-20 RX ADMIN — ENOXAPARIN SODIUM 60 MILLIGRAM(S): 100 INJECTION SUBCUTANEOUS at 11:25

## 2017-03-20 RX ADMIN — Medication 40 MILLIEQUIVALENT(S): at 11:28

## 2017-03-20 RX ADMIN — Medication 3 MILLILITER(S): at 13:30

## 2017-03-20 RX ADMIN — PANTOPRAZOLE SODIUM 40 MILLIGRAM(S): 20 TABLET, DELAYED RELEASE ORAL at 11:28

## 2017-03-20 RX ADMIN — ATORVASTATIN CALCIUM 40 MILLIGRAM(S): 80 TABLET, FILM COATED ORAL at 21:25

## 2017-03-20 RX ADMIN — Medication 0.12 MILLIGRAM(S): at 06:22

## 2017-03-20 RX ADMIN — PIPERACILLIN AND TAZOBACTAM 25 GRAM(S): 4; .5 INJECTION, POWDER, LYOPHILIZED, FOR SOLUTION INTRAVENOUS at 18:12

## 2017-03-20 RX ADMIN — POLYETHYLENE GLYCOL 3350 17 GRAM(S): 17 POWDER, FOR SOLUTION ORAL at 21:25

## 2017-03-20 RX ADMIN — WARFARIN SODIUM 1 MILLIGRAM(S): 2.5 TABLET ORAL at 21:25

## 2017-03-20 RX ADMIN — Medication 40 MILLIGRAM(S): at 06:22

## 2017-03-20 RX ADMIN — SENNA PLUS 2 TABLET(S): 8.6 TABLET ORAL at 21:24

## 2017-03-20 RX ADMIN — Medication 40 MILLIEQUIVALENT(S): at 15:57

## 2017-03-20 RX ADMIN — Medication 0.5 MILLIGRAM(S): at 07:54

## 2017-03-20 RX ADMIN — Medication 3 MILLILITER(S): at 07:54

## 2017-03-20 RX ADMIN — Medication 50 MICROGRAM(S): at 06:22

## 2017-03-20 RX ADMIN — Medication 40 MILLIEQUIVALENT(S): at 06:35

## 2017-03-20 RX ADMIN — Medication 3 MILLILITER(S): at 02:09

## 2017-03-20 RX ADMIN — PIPERACILLIN AND TAZOBACTAM 25 GRAM(S): 4; .5 INJECTION, POWDER, LYOPHILIZED, FOR SOLUTION INTRAVENOUS at 11:26

## 2017-03-20 NOTE — PHYSICAL THERAPY INITIAL EVALUATION ADULT - CRITERIA FOR SKILLED THERAPEUTIC INTERVENTIONS
impairments found/anticipated discharge recommendation/risk reduction/prevention/therapy frequency/functional limitations in following categories/rehab potential

## 2017-03-20 NOTE — PHYSICAL THERAPY INITIAL EVALUATION ADULT - GENERAL OBSERVATIONS, REHAB EVAL
Patient received semi molina in bed, no apparent distress, 35% trach collar, IV and cardiac monitor in place.

## 2017-03-20 NOTE — PHYSICAL THERAPY INITIAL EVALUATION ADULT - ADDITIONAL COMMENTS
Pt came from [facility].  As per pt, pt ambulated w/ a rolling walker and assistance.

## 2017-03-20 NOTE — PHYSICAL THERAPY INITIAL EVALUATION ADULT - TRANSFER SKILLS, REHAB EVAL
Please refer to ultrasound report under 'Imaging' Studies of 'Chart Review' tabs.    Pietro Bermeo M.D.    
needed assist

## 2017-03-21 RX ORDER — ALLOPURINOL 300 MG
100 TABLET ORAL DAILY
Qty: 0 | Refills: 0 | Status: DISCONTINUED | OUTPATIENT
Start: 2017-03-21 | End: 2017-03-28

## 2017-03-21 RX ORDER — GABAPENTIN 400 MG/1
200 CAPSULE ORAL
Qty: 0 | Refills: 0 | Status: DISCONTINUED | OUTPATIENT
Start: 2017-03-21 | End: 2017-03-28

## 2017-03-21 RX ORDER — ACETAMINOPHEN 500 MG
650 TABLET ORAL EVERY 6 HOURS
Qty: 0 | Refills: 0 | Status: DISCONTINUED | OUTPATIENT
Start: 2017-03-21 | End: 2017-03-28

## 2017-03-21 RX ORDER — POTASSIUM PHOSPHATE, MONOBASIC POTASSIUM PHOSPHATE, DIBASIC 236; 224 MG/ML; MG/ML
15 INJECTION, SOLUTION INTRAVENOUS ONCE
Qty: 0 | Refills: 0 | Status: COMPLETED | OUTPATIENT
Start: 2017-03-21 | End: 2017-03-21

## 2017-03-21 RX ORDER — WARFARIN SODIUM 2.5 MG/1
1 TABLET ORAL ONCE
Qty: 0 | Refills: 0 | Status: COMPLETED | OUTPATIENT
Start: 2017-03-21 | End: 2017-03-21

## 2017-03-21 RX ADMIN — PANTOPRAZOLE SODIUM 40 MILLIGRAM(S): 20 TABLET, DELAYED RELEASE ORAL at 11:26

## 2017-03-21 RX ADMIN — PIPERACILLIN AND TAZOBACTAM 25 GRAM(S): 4; .5 INJECTION, POWDER, LYOPHILIZED, FOR SOLUTION INTRAVENOUS at 03:22

## 2017-03-21 RX ADMIN — Medication 100 MILLIGRAM(S): at 18:45

## 2017-03-21 RX ADMIN — PIPERACILLIN AND TAZOBACTAM 25 GRAM(S): 4; .5 INJECTION, POWDER, LYOPHILIZED, FOR SOLUTION INTRAVENOUS at 11:36

## 2017-03-21 RX ADMIN — Medication 40 MILLIGRAM(S): at 05:16

## 2017-03-21 RX ADMIN — Medication 3 MILLILITER(S): at 13:57

## 2017-03-21 RX ADMIN — PIPERACILLIN AND TAZOBACTAM 25 GRAM(S): 4; .5 INJECTION, POWDER, LYOPHILIZED, FOR SOLUTION INTRAVENOUS at 18:45

## 2017-03-21 RX ADMIN — Medication 3 MILLILITER(S): at 08:11

## 2017-03-21 RX ADMIN — Medication 50 MICROGRAM(S): at 05:17

## 2017-03-21 RX ADMIN — POLYETHYLENE GLYCOL 3350 17 GRAM(S): 17 POWDER, FOR SOLUTION ORAL at 22:02

## 2017-03-21 RX ADMIN — POTASSIUM PHOSPHATE, MONOBASIC POTASSIUM PHOSPHATE, DIBASIC 63.75 MILLIMOLE(S): 236; 224 INJECTION, SOLUTION INTRAVENOUS at 11:26

## 2017-03-21 RX ADMIN — SENNA PLUS 2 TABLET(S): 8.6 TABLET ORAL at 22:02

## 2017-03-21 RX ADMIN — Medication 3 MILLILITER(S): at 19:15

## 2017-03-21 RX ADMIN — Medication 0.5 MILLIGRAM(S): at 08:11

## 2017-03-21 RX ADMIN — Medication 0.12 MILLIGRAM(S): at 05:18

## 2017-03-21 RX ADMIN — Medication 3 MILLILITER(S): at 02:09

## 2017-03-21 RX ADMIN — Medication 250 MILLIGRAM(S): at 10:01

## 2017-03-21 RX ADMIN — ENOXAPARIN SODIUM 60 MILLIGRAM(S): 100 INJECTION SUBCUTANEOUS at 11:27

## 2017-03-21 RX ADMIN — ATORVASTATIN CALCIUM 40 MILLIGRAM(S): 80 TABLET, FILM COATED ORAL at 22:02

## 2017-03-21 RX ADMIN — Medication 0.5 MILLIGRAM(S): at 19:15

## 2017-03-21 RX ADMIN — Medication 81 MILLIGRAM(S): at 11:26

## 2017-03-21 RX ADMIN — GABAPENTIN 200 MILLIGRAM(S): 400 CAPSULE ORAL at 18:45

## 2017-03-21 RX ADMIN — WARFARIN SODIUM 1 MILLIGRAM(S): 2.5 TABLET ORAL at 22:02

## 2017-03-21 RX ADMIN — Medication 100 MILLIGRAM(S): at 05:16

## 2017-03-21 NOTE — PROVIDER CONTACT NOTE (CHANGE IN STATUS NOTIFICATION) - ASSESSMENT
Left lower extremity wound 1.2  x  1 wound bed dry with fibrin mesh in bed of wound geroniom wound WDL

## 2017-03-22 LAB
ALBUMIN SERPL ELPH-MCNC: 2.5 G/DL — LOW (ref 3.3–5)
ALP SERPL-CCNC: 110 U/L — SIGNIFICANT CHANGE UP (ref 40–120)
ALT FLD-CCNC: 18 U/L — SIGNIFICANT CHANGE UP (ref 12–78)
ANION GAP SERPL CALC-SCNC: 10 MMOL/L — SIGNIFICANT CHANGE UP (ref 5–17)
AST SERPL-CCNC: 35 U/L — SIGNIFICANT CHANGE UP (ref 15–37)
BILIRUB SERPL-MCNC: 1.1 MG/DL — SIGNIFICANT CHANGE UP (ref 0.2–1.2)
BUN SERPL-MCNC: 32 MG/DL — HIGH (ref 7–23)
CALCIUM SERPL-MCNC: 8.2 MG/DL — LOW (ref 8.5–10.1)
CHLORIDE SERPL-SCNC: 100 MMOL/L — SIGNIFICANT CHANGE UP (ref 96–108)
CK SERPL-CCNC: 61 U/L — SIGNIFICANT CHANGE UP (ref 26–192)
CO2 SERPL-SCNC: 35 MMOL/L — HIGH (ref 22–31)
CREAT SERPL-MCNC: 1.5 MG/DL — HIGH (ref 0.5–1.3)
GLUCOSE SERPL-MCNC: 74 MG/DL — SIGNIFICANT CHANGE UP (ref 70–99)
HCT VFR BLD CALC: 30.6 % — LOW (ref 34.5–45)
HGB BLD-MCNC: 9.4 G/DL — LOW (ref 11.5–15.5)
INR BLD: 1.62 RATIO — HIGH (ref 0.88–1.16)
MAGNESIUM SERPL-MCNC: 2.3 MG/DL — SIGNIFICANT CHANGE UP (ref 1.8–2.4)
MCHC RBC-ENTMCNC: 30 PG — SIGNIFICANT CHANGE UP (ref 27–34)
MCHC RBC-ENTMCNC: 30.8 GM/DL — LOW (ref 32–36)
MCV RBC AUTO: 97.6 FL — SIGNIFICANT CHANGE UP (ref 80–100)
PHOSPHATE SERPL-MCNC: 2.6 MG/DL — SIGNIFICANT CHANGE UP (ref 2.5–4.5)
PLATELET # BLD AUTO: 140 K/UL — LOW (ref 150–400)
POTASSIUM SERPL-MCNC: 3.9 MMOL/L — SIGNIFICANT CHANGE UP (ref 3.5–5.3)
POTASSIUM SERPL-SCNC: 3.9 MMOL/L — SIGNIFICANT CHANGE UP (ref 3.5–5.3)
PROCALCITONIN SERPL-MCNC: 0.23 NG/ML — HIGH (ref 0–0.05)
PROT SERPL-MCNC: 5.9 G/DL — LOW (ref 6–8.3)
PROTHROM AB SERPL-ACNC: 17.8 SEC — HIGH (ref 9.8–12.7)
RBC # BLD: 3.14 M/UL — LOW (ref 3.8–5.2)
RBC # FLD: 16.5 % — HIGH (ref 10.3–14.5)
SODIUM SERPL-SCNC: 145 MMOL/L — SIGNIFICANT CHANGE UP (ref 135–145)
TROPONIN I SERPL-MCNC: 0.17 NG/ML — HIGH (ref 0.01–0.04)
VANCOMYCIN TROUGH SERPL-MCNC: 24.6 UG/ML — HIGH (ref 10–20)
WBC # BLD: 7.8 K/UL — SIGNIFICANT CHANGE UP (ref 3.8–10.5)
WBC # FLD AUTO: 7.8 K/UL — SIGNIFICANT CHANGE UP (ref 3.8–10.5)

## 2017-03-22 PROCEDURE — 71010: CPT | Mod: 26

## 2017-03-22 RX ORDER — METOPROLOL TARTRATE 50 MG
25 TABLET ORAL
Qty: 0 | Refills: 0 | Status: DISCONTINUED | OUTPATIENT
Start: 2017-03-22 | End: 2017-03-22

## 2017-03-22 RX ORDER — SODIUM CHLORIDE 9 MG/ML
1000 INJECTION INTRAMUSCULAR; INTRAVENOUS; SUBCUTANEOUS
Qty: 0 | Refills: 0 | Status: DISCONTINUED | OUTPATIENT
Start: 2017-03-22 | End: 2017-03-22

## 2017-03-22 RX ORDER — DEXTROSE MONOHYDRATE, SODIUM CHLORIDE, AND POTASSIUM CHLORIDE 50; .745; 4.5 G/1000ML; G/1000ML; G/1000ML
1000 INJECTION, SOLUTION INTRAVENOUS
Qty: 0 | Refills: 0 | Status: DISCONTINUED | OUTPATIENT
Start: 2017-03-22 | End: 2017-03-24

## 2017-03-22 RX ORDER — FUROSEMIDE 40 MG
20 TABLET ORAL
Qty: 0 | Refills: 0 | Status: DISCONTINUED | OUTPATIENT
Start: 2017-03-22 | End: 2017-03-26

## 2017-03-22 RX ORDER — WARFARIN SODIUM 2.5 MG/1
1 TABLET ORAL ONCE
Qty: 0 | Refills: 0 | Status: COMPLETED | OUTPATIENT
Start: 2017-03-22 | End: 2017-03-22

## 2017-03-22 RX ORDER — METOPROLOL TARTRATE 50 MG
25 TABLET ORAL THREE TIMES A DAY
Qty: 0 | Refills: 0 | Status: DISCONTINUED | OUTPATIENT
Start: 2017-03-22 | End: 2017-03-28

## 2017-03-22 RX ORDER — DIGOXIN 250 MCG
0.12 TABLET ORAL EVERY OTHER DAY
Qty: 0 | Refills: 0 | Status: DISCONTINUED | OUTPATIENT
Start: 2017-03-22 | End: 2017-03-28

## 2017-03-22 RX ADMIN — Medication 50 MICROGRAM(S): at 06:39

## 2017-03-22 RX ADMIN — Medication 3 MILLILITER(S): at 02:19

## 2017-03-22 RX ADMIN — PANTOPRAZOLE SODIUM 40 MILLIGRAM(S): 20 TABLET, DELAYED RELEASE ORAL at 11:27

## 2017-03-22 RX ADMIN — PIPERACILLIN AND TAZOBACTAM 25 GRAM(S): 4; .5 INJECTION, POWDER, LYOPHILIZED, FOR SOLUTION INTRAVENOUS at 11:26

## 2017-03-22 RX ADMIN — Medication 100 MILLIGRAM(S): at 06:39

## 2017-03-22 RX ADMIN — Medication 20 MILLIGRAM(S): at 18:08

## 2017-03-22 RX ADMIN — SENNA PLUS 2 TABLET(S): 8.6 TABLET ORAL at 22:01

## 2017-03-22 RX ADMIN — DEXTROSE MONOHYDRATE, SODIUM CHLORIDE, AND POTASSIUM CHLORIDE 50 MILLILITER(S): 50; .745; 4.5 INJECTION, SOLUTION INTRAVENOUS at 11:26

## 2017-03-22 RX ADMIN — GABAPENTIN 200 MILLIGRAM(S): 400 CAPSULE ORAL at 18:08

## 2017-03-22 RX ADMIN — Medication 0.12 MILLIGRAM(S): at 06:39

## 2017-03-22 RX ADMIN — WARFARIN SODIUM 1 MILLIGRAM(S): 2.5 TABLET ORAL at 22:01

## 2017-03-22 RX ADMIN — PIPERACILLIN AND TAZOBACTAM 25 GRAM(S): 4; .5 INJECTION, POWDER, LYOPHILIZED, FOR SOLUTION INTRAVENOUS at 03:40

## 2017-03-22 RX ADMIN — Medication 40 MILLIGRAM(S): at 06:39

## 2017-03-22 RX ADMIN — Medication 25 MILLIGRAM(S): at 22:01

## 2017-03-22 RX ADMIN — SODIUM CHLORIDE 50 MILLILITER(S): 9 INJECTION INTRAMUSCULAR; INTRAVENOUS; SUBCUTANEOUS at 09:59

## 2017-03-22 RX ADMIN — Medication 1 TABLET(S): at 14:47

## 2017-03-22 RX ADMIN — Medication 0.5 MILLIGRAM(S): at 07:44

## 2017-03-22 RX ADMIN — Medication 81 MILLIGRAM(S): at 11:27

## 2017-03-22 RX ADMIN — GABAPENTIN 200 MILLIGRAM(S): 400 CAPSULE ORAL at 06:39

## 2017-03-22 RX ADMIN — Medication 3 MILLILITER(S): at 07:44

## 2017-03-22 RX ADMIN — Medication 0.5 MILLIGRAM(S): at 19:57

## 2017-03-22 RX ADMIN — PIPERACILLIN AND TAZOBACTAM 25 GRAM(S): 4; .5 INJECTION, POWDER, LYOPHILIZED, FOR SOLUTION INTRAVENOUS at 18:08

## 2017-03-22 RX ADMIN — Medication 25 MILLIGRAM(S): at 14:47

## 2017-03-22 RX ADMIN — Medication 0.12 MILLIGRAM(S): at 11:27

## 2017-03-22 RX ADMIN — Medication 100 MILLIGRAM(S): at 11:27

## 2017-03-22 RX ADMIN — Medication 100 MILLIGRAM(S): at 18:08

## 2017-03-22 RX ADMIN — Medication 3 MILLILITER(S): at 19:57

## 2017-03-22 RX ADMIN — POLYETHYLENE GLYCOL 3350 17 GRAM(S): 17 POWDER, FOR SOLUTION ORAL at 22:02

## 2017-03-22 RX ADMIN — ATORVASTATIN CALCIUM 40 MILLIGRAM(S): 80 TABLET, FILM COATED ORAL at 22:01

## 2017-03-22 RX ADMIN — ENOXAPARIN SODIUM 60 MILLIGRAM(S): 100 INJECTION SUBCUTANEOUS at 11:27

## 2017-03-22 RX ADMIN — Medication 3 MILLILITER(S): at 13:49

## 2017-03-22 RX ADMIN — Medication 20 MILLIGRAM(S): at 11:26

## 2017-03-23 LAB
ALBUMIN SERPL ELPH-MCNC: 2.4 G/DL — LOW (ref 3.3–5)
ALP SERPL-CCNC: 107 U/L — SIGNIFICANT CHANGE UP (ref 40–120)
ALT FLD-CCNC: 16 U/L — SIGNIFICANT CHANGE UP (ref 12–78)
ANION GAP SERPL CALC-SCNC: 5 MMOL/L — SIGNIFICANT CHANGE UP (ref 5–17)
AST SERPL-CCNC: 35 U/L — SIGNIFICANT CHANGE UP (ref 15–37)
BILIRUB SERPL-MCNC: 0.9 MG/DL — SIGNIFICANT CHANGE UP (ref 0.2–1.2)
BUN SERPL-MCNC: 26 MG/DL — HIGH (ref 7–23)
CALCIUM SERPL-MCNC: 7.9 MG/DL — LOW (ref 8.5–10.1)
CHLORIDE SERPL-SCNC: 98 MMOL/L — SIGNIFICANT CHANGE UP (ref 96–108)
CO2 SERPL-SCNC: 38 MMOL/L — HIGH (ref 22–31)
CREAT SERPL-MCNC: 1.5 MG/DL — HIGH (ref 0.5–1.3)
DIGOXIN SERPL-MCNC: 1.4 NG/ML — SIGNIFICANT CHANGE UP (ref 0.8–2)
GLUCOSE SERPL-MCNC: 175 MG/DL — HIGH (ref 70–99)
HCT VFR BLD CALC: 30.7 % — LOW (ref 34.5–45)
HGB BLD-MCNC: 9 G/DL — LOW (ref 11.5–15.5)
INR BLD: 2.04 RATIO — HIGH (ref 0.88–1.16)
MAGNESIUM SERPL-MCNC: 2.2 MG/DL — SIGNIFICANT CHANGE UP (ref 1.8–2.4)
MCHC RBC-ENTMCNC: 29.3 GM/DL — LOW (ref 32–36)
MCHC RBC-ENTMCNC: 29.7 PG — SIGNIFICANT CHANGE UP (ref 27–34)
MCV RBC AUTO: 101.1 FL — HIGH (ref 80–100)
PHOSPHATE SERPL-MCNC: 2 MG/DL — LOW (ref 2.5–4.5)
PLATELET # BLD AUTO: 162 K/UL — SIGNIFICANT CHANGE UP (ref 150–400)
POTASSIUM SERPL-MCNC: 3.8 MMOL/L — SIGNIFICANT CHANGE UP (ref 3.5–5.3)
POTASSIUM SERPL-SCNC: 3.8 MMOL/L — SIGNIFICANT CHANGE UP (ref 3.5–5.3)
PROT SERPL-MCNC: 6 G/DL — SIGNIFICANT CHANGE UP (ref 6–8.3)
PROTHROM AB SERPL-ACNC: 22.6 SEC — HIGH (ref 9.8–12.7)
RBC # BLD: 3.03 M/UL — LOW (ref 3.8–5.2)
RBC # FLD: 16.8 % — HIGH (ref 10.3–14.5)
SODIUM SERPL-SCNC: 141 MMOL/L — SIGNIFICANT CHANGE UP (ref 135–145)
WBC # BLD: 7.5 K/UL — SIGNIFICANT CHANGE UP (ref 3.8–10.5)
WBC # FLD AUTO: 7.5 K/UL — SIGNIFICANT CHANGE UP (ref 3.8–10.5)

## 2017-03-23 RX ORDER — WARFARIN SODIUM 2.5 MG/1
1 TABLET ORAL ONCE
Qty: 0 | Refills: 0 | Status: DISCONTINUED | OUTPATIENT
Start: 2017-03-23 | End: 2017-03-24

## 2017-03-23 RX ADMIN — Medication 100 MILLIGRAM(S): at 17:54

## 2017-03-23 RX ADMIN — Medication 3 MILLILITER(S): at 19:23

## 2017-03-23 RX ADMIN — Medication 20 MILLIGRAM(S): at 17:54

## 2017-03-23 RX ADMIN — GABAPENTIN 200 MILLIGRAM(S): 400 CAPSULE ORAL at 17:54

## 2017-03-23 RX ADMIN — Medication 100 MILLIGRAM(S): at 06:10

## 2017-03-23 RX ADMIN — SENNA PLUS 2 TABLET(S): 8.6 TABLET ORAL at 21:35

## 2017-03-23 RX ADMIN — Medication 25 MILLIGRAM(S): at 06:10

## 2017-03-23 RX ADMIN — Medication 3 MILLILITER(S): at 07:34

## 2017-03-23 RX ADMIN — PANTOPRAZOLE SODIUM 40 MILLIGRAM(S): 20 TABLET, DELAYED RELEASE ORAL at 12:59

## 2017-03-23 RX ADMIN — Medication 100 MILLIGRAM(S): at 12:59

## 2017-03-23 RX ADMIN — Medication 50 MICROGRAM(S): at 06:10

## 2017-03-23 RX ADMIN — Medication 1 TABLET(S): at 12:59

## 2017-03-23 RX ADMIN — Medication 650 MILLIGRAM(S): at 21:19

## 2017-03-23 RX ADMIN — ATORVASTATIN CALCIUM 40 MILLIGRAM(S): 80 TABLET, FILM COATED ORAL at 21:35

## 2017-03-23 RX ADMIN — Medication 0.5 MILLIGRAM(S): at 19:22

## 2017-03-23 RX ADMIN — Medication 81 MILLIGRAM(S): at 12:59

## 2017-03-23 RX ADMIN — POLYETHYLENE GLYCOL 3350 17 GRAM(S): 17 POWDER, FOR SOLUTION ORAL at 21:35

## 2017-03-23 RX ADMIN — DEXTROSE MONOHYDRATE, SODIUM CHLORIDE, AND POTASSIUM CHLORIDE 50 MILLILITER(S): 50; .745; 4.5 INJECTION, SOLUTION INTRAVENOUS at 05:22

## 2017-03-23 RX ADMIN — GABAPENTIN 200 MILLIGRAM(S): 400 CAPSULE ORAL at 06:10

## 2017-03-23 RX ADMIN — Medication 0.5 MILLIGRAM(S): at 07:34

## 2017-03-23 RX ADMIN — Medication 25 MILLIGRAM(S): at 21:35

## 2017-03-23 RX ADMIN — Medication 3 MILLILITER(S): at 12:55

## 2017-03-23 RX ADMIN — WARFARIN SODIUM 1 MILLIGRAM(S): 2.5 TABLET ORAL at 22:00

## 2017-03-23 RX ADMIN — Medication 20 MILLIGRAM(S): at 06:10

## 2017-03-23 RX ADMIN — Medication 3 MILLILITER(S): at 01:19

## 2017-03-24 LAB
ALBUMIN SERPL ELPH-MCNC: 2.3 G/DL — LOW (ref 3.3–5)
ALP SERPL-CCNC: 106 U/L — SIGNIFICANT CHANGE UP (ref 40–120)
ALT FLD-CCNC: 18 U/L — SIGNIFICANT CHANGE UP (ref 12–78)
ANION GAP SERPL CALC-SCNC: 6 MMOL/L — SIGNIFICANT CHANGE UP (ref 5–17)
AST SERPL-CCNC: 33 U/L — SIGNIFICANT CHANGE UP (ref 15–37)
BILIRUB SERPL-MCNC: 0.6 MG/DL — SIGNIFICANT CHANGE UP (ref 0.2–1.2)
BUN SERPL-MCNC: 30 MG/DL — HIGH (ref 7–23)
CALCIUM SERPL-MCNC: 7.9 MG/DL — LOW (ref 8.5–10.1)
CHLORIDE SERPL-SCNC: 99 MMOL/L — SIGNIFICANT CHANGE UP (ref 96–108)
CO2 SERPL-SCNC: 37 MMOL/L — HIGH (ref 22–31)
CREAT SERPL-MCNC: 1.4 MG/DL — HIGH (ref 0.5–1.3)
GLUCOSE SERPL-MCNC: 98 MG/DL — SIGNIFICANT CHANGE UP (ref 70–99)
HCT VFR BLD CALC: 27.8 % — LOW (ref 34.5–45)
HGB BLD-MCNC: 8.4 G/DL — LOW (ref 11.5–15.5)
INR BLD: 2.15 RATIO — HIGH (ref 0.88–1.16)
INR BLD: 2.24 RATIO — HIGH (ref 0.88–1.16)
MAGNESIUM SERPL-MCNC: 2.2 MG/DL — SIGNIFICANT CHANGE UP (ref 1.8–2.4)
MCHC RBC-ENTMCNC: 29.8 PG — SIGNIFICANT CHANGE UP (ref 27–34)
MCHC RBC-ENTMCNC: 30.3 GM/DL — LOW (ref 32–36)
MCV RBC AUTO: 98.2 FL — SIGNIFICANT CHANGE UP (ref 80–100)
PHOSPHATE SERPL-MCNC: 2.3 MG/DL — LOW (ref 2.5–4.5)
PLATELET # BLD AUTO: 199 K/UL — SIGNIFICANT CHANGE UP (ref 150–400)
POTASSIUM SERPL-MCNC: 3.6 MMOL/L — SIGNIFICANT CHANGE UP (ref 3.5–5.3)
POTASSIUM SERPL-SCNC: 3.6 MMOL/L — SIGNIFICANT CHANGE UP (ref 3.5–5.3)
PROT SERPL-MCNC: 5.6 G/DL — LOW (ref 6–8.3)
PROTHROM AB SERPL-ACNC: 23.8 SEC — HIGH (ref 9.8–12.7)
PROTHROM AB SERPL-ACNC: 24.8 SEC — HIGH (ref 9.8–12.7)
RBC # BLD: 2.83 M/UL — LOW (ref 3.8–5.2)
RBC # FLD: 17 % — HIGH (ref 10.3–14.5)
SODIUM SERPL-SCNC: 142 MMOL/L — SIGNIFICANT CHANGE UP (ref 135–145)
WBC # BLD: 5.8 K/UL — SIGNIFICANT CHANGE UP (ref 3.8–10.5)
WBC # FLD AUTO: 5.8 K/UL — SIGNIFICANT CHANGE UP (ref 3.8–10.5)

## 2017-03-24 PROCEDURE — 74230 X-RAY XM SWLNG FUNCJ C+: CPT | Mod: 26

## 2017-03-24 PROCEDURE — 71010: CPT | Mod: 26

## 2017-03-24 RX ORDER — POTASSIUM CHLORIDE 20 MEQ
20 PACKET (EA) ORAL DAILY
Qty: 0 | Refills: 0 | Status: DISCONTINUED | OUTPATIENT
Start: 2017-03-24 | End: 2017-03-28

## 2017-03-24 RX ORDER — SODIUM,POTASSIUM PHOSPHATES 278-250MG
1 POWDER IN PACKET (EA) ORAL
Qty: 0 | Refills: 0 | Status: COMPLETED | OUTPATIENT
Start: 2017-03-24 | End: 2017-03-26

## 2017-03-24 RX ORDER — WARFARIN SODIUM 2.5 MG/1
1 TABLET ORAL ONCE
Qty: 0 | Refills: 0 | Status: COMPLETED | OUTPATIENT
Start: 2017-03-24 | End: 2017-03-24

## 2017-03-24 RX ADMIN — Medication 1 TABLET(S): at 15:06

## 2017-03-24 RX ADMIN — Medication 50 MICROGRAM(S): at 05:41

## 2017-03-24 RX ADMIN — GABAPENTIN 200 MILLIGRAM(S): 400 CAPSULE ORAL at 17:22

## 2017-03-24 RX ADMIN — Medication 3 MILLILITER(S): at 19:56

## 2017-03-24 RX ADMIN — ATORVASTATIN CALCIUM 40 MILLIGRAM(S): 80 TABLET, FILM COATED ORAL at 21:57

## 2017-03-24 RX ADMIN — SENNA PLUS 2 TABLET(S): 8.6 TABLET ORAL at 21:57

## 2017-03-24 RX ADMIN — Medication 25 MILLIGRAM(S): at 05:42

## 2017-03-24 RX ADMIN — Medication 0.12 MILLIGRAM(S): at 15:05

## 2017-03-24 RX ADMIN — Medication 100 MILLIGRAM(S): at 17:21

## 2017-03-24 RX ADMIN — Medication 25 MILLIGRAM(S): at 15:07

## 2017-03-24 RX ADMIN — Medication 1 TABLET(S): at 17:21

## 2017-03-24 RX ADMIN — Medication 0.5 MILLIGRAM(S): at 19:56

## 2017-03-24 RX ADMIN — Medication 3 MILLILITER(S): at 08:25

## 2017-03-24 RX ADMIN — WARFARIN SODIUM 1 MILLIGRAM(S): 2.5 TABLET ORAL at 21:57

## 2017-03-24 RX ADMIN — Medication 20 MILLIEQUIVALENT(S): at 15:07

## 2017-03-24 RX ADMIN — PANTOPRAZOLE SODIUM 40 MILLIGRAM(S): 20 TABLET, DELAYED RELEASE ORAL at 15:07

## 2017-03-24 RX ADMIN — Medication 3 MILLILITER(S): at 14:16

## 2017-03-24 RX ADMIN — Medication 20 MILLIGRAM(S): at 05:53

## 2017-03-24 RX ADMIN — Medication 0.5 MILLIGRAM(S): at 08:26

## 2017-03-24 RX ADMIN — Medication 100 MILLIGRAM(S): at 15:06

## 2017-03-24 RX ADMIN — GABAPENTIN 200 MILLIGRAM(S): 400 CAPSULE ORAL at 05:41

## 2017-03-24 RX ADMIN — Medication 100 MILLIGRAM(S): at 05:44

## 2017-03-24 RX ADMIN — Medication 81 MILLIGRAM(S): at 15:07

## 2017-03-24 RX ADMIN — Medication 3 MILLILITER(S): at 01:37

## 2017-03-24 NOTE — SWALLOW VFSS/MBS ASSESSMENT ADULT - ROSENBEK'S PENETRATION ASPIRATION SCALE
(1) no aspiration, contrast does not enter airway (8) contrast passes glottis, visible subglottic residue remains, absent patient response (aspiration)

## 2017-03-24 NOTE — SWALLOW VFSS/MBS ASSESSMENT ADULT - NS SWALLOW VFSS REC ASPIR MON
position upright (90Y)/throat clearing/upper respiratory infection/pneumonia/change of breathing pattern/cough/fever/gurgly voice/oral hygiene

## 2017-03-24 NOTE — SWALLOW VFSS/MBS ASSESSMENT ADULT - ORAL PHASE
Reduced anterior - posterior transport/Uncontrolled bolus / spillover in terry-pharynx/Delayed oral transit time Delayed oral transit time/Uncontrolled bolus / spillover in hypopharynx/Reduced anterior - posterior transport

## 2017-03-24 NOTE — SWALLOW VFSS/MBS ASSESSMENT ADULT - COMMENTS
82 y/o woman with h/o trach, vent seen for MBS to r/o aspiration. Pt tested in the lateral position inclusive of oral & pharyngeal phases of the swallow. Pt trialed with puree, soft solids, thin, and nectar thick liquids. Oral prep reduced with increased mastication time, latent A-P transport, premature loss of bolus to valleculae with spillover to pyriform sinuses, delayed swallow, with aspiration of thin liquids during the swallow, pt unable to clear with cued cough. Laryngeal excursion reduced. Pt with no aspiration with solids or nectar thick liquids. Reduced bolus control noted however with straw sips resulting in increased pharyngeal residue.

## 2017-03-25 LAB
ANION GAP SERPL CALC-SCNC: 8 MMOL/L — SIGNIFICANT CHANGE UP (ref 5–17)
BUN SERPL-MCNC: 30 MG/DL — HIGH (ref 7–23)
CALCIUM SERPL-MCNC: 7.9 MG/DL — LOW (ref 8.5–10.1)
CHLORIDE SERPL-SCNC: 99 MMOL/L — SIGNIFICANT CHANGE UP (ref 96–108)
CO2 SERPL-SCNC: 34 MMOL/L — HIGH (ref 22–31)
CREAT SERPL-MCNC: 1.5 MG/DL — HIGH (ref 0.5–1.3)
GLUCOSE SERPL-MCNC: 86 MG/DL — SIGNIFICANT CHANGE UP (ref 70–99)
HCT VFR BLD CALC: 29.3 % — LOW (ref 34.5–45)
HGB BLD-MCNC: 8.8 G/DL — LOW (ref 11.5–15.5)
INR BLD: 2.06 RATIO — HIGH (ref 0.88–1.16)
MAGNESIUM SERPL-MCNC: 2.1 MG/DL — SIGNIFICANT CHANGE UP (ref 1.8–2.4)
MCHC RBC-ENTMCNC: 30 GM/DL — LOW (ref 32–36)
MCHC RBC-ENTMCNC: 30 PG — SIGNIFICANT CHANGE UP (ref 27–34)
MCV RBC AUTO: 100.3 FL — HIGH (ref 80–100)
PHOSPHATE SERPL-MCNC: 2.8 MG/DL — SIGNIFICANT CHANGE UP (ref 2.5–4.5)
PLATELET # BLD AUTO: 217 K/UL — SIGNIFICANT CHANGE UP (ref 150–400)
POTASSIUM SERPL-MCNC: 4.5 MMOL/L — SIGNIFICANT CHANGE UP (ref 3.5–5.3)
POTASSIUM SERPL-SCNC: 4.5 MMOL/L — SIGNIFICANT CHANGE UP (ref 3.5–5.3)
PREALB SERPL-MCNC: 17.4 MG/DL — LOW (ref 20–40)
PROTHROM AB SERPL-ACNC: 22.8 SEC — HIGH (ref 9.8–12.7)
RBC # BLD: 2.92 M/UL — LOW (ref 3.8–5.2)
RBC # FLD: 16.8 % — HIGH (ref 10.3–14.5)
SODIUM SERPL-SCNC: 141 MMOL/L — SIGNIFICANT CHANGE UP (ref 135–145)
WBC # BLD: 6.2 K/UL — SIGNIFICANT CHANGE UP (ref 3.8–10.5)
WBC # FLD AUTO: 6.2 K/UL — SIGNIFICANT CHANGE UP (ref 3.8–10.5)

## 2017-03-25 RX ORDER — MAGNESIUM HYDROXIDE 400 MG/1
30 TABLET, CHEWABLE ORAL DAILY
Qty: 0 | Refills: 0 | Status: DISCONTINUED | OUTPATIENT
Start: 2017-03-25 | End: 2017-03-28

## 2017-03-25 RX ORDER — WARFARIN SODIUM 2.5 MG/1
1 TABLET ORAL ONCE
Qty: 0 | Refills: 0 | Status: COMPLETED | OUTPATIENT
Start: 2017-03-25 | End: 2017-03-25

## 2017-03-25 RX ADMIN — GABAPENTIN 200 MILLIGRAM(S): 400 CAPSULE ORAL at 05:34

## 2017-03-25 RX ADMIN — Medication 20 MILLIGRAM(S): at 06:25

## 2017-03-25 RX ADMIN — Medication 3 MILLILITER(S): at 01:29

## 2017-03-25 RX ADMIN — Medication 3 MILLILITER(S): at 07:44

## 2017-03-25 RX ADMIN — Medication 25 MILLIGRAM(S): at 21:50

## 2017-03-25 RX ADMIN — ATORVASTATIN CALCIUM 40 MILLIGRAM(S): 80 TABLET, FILM COATED ORAL at 21:52

## 2017-03-25 RX ADMIN — Medication 650 MILLIGRAM(S): at 15:57

## 2017-03-25 RX ADMIN — Medication 50 MICROGRAM(S): at 05:33

## 2017-03-25 RX ADMIN — PANTOPRAZOLE SODIUM 40 MILLIGRAM(S): 20 TABLET, DELAYED RELEASE ORAL at 11:11

## 2017-03-25 RX ADMIN — Medication 0.5 MILLIGRAM(S): at 07:44

## 2017-03-25 RX ADMIN — Medication 30 MILLILITER(S): at 21:51

## 2017-03-25 RX ADMIN — WARFARIN SODIUM 1 MILLIGRAM(S): 2.5 TABLET ORAL at 21:50

## 2017-03-25 RX ADMIN — Medication 100 MILLIGRAM(S): at 17:31

## 2017-03-25 RX ADMIN — Medication 100 MILLIGRAM(S): at 11:11

## 2017-03-25 RX ADMIN — GABAPENTIN 200 MILLIGRAM(S): 400 CAPSULE ORAL at 17:28

## 2017-03-25 RX ADMIN — Medication 1 TABLET(S): at 08:15

## 2017-03-25 RX ADMIN — Medication 3 MILLILITER(S): at 13:36

## 2017-03-25 RX ADMIN — Medication 25 MILLIGRAM(S): at 06:25

## 2017-03-25 RX ADMIN — Medication 100 MILLIGRAM(S): at 05:33

## 2017-03-25 RX ADMIN — Medication 3 MILLILITER(S): at 19:53

## 2017-03-25 RX ADMIN — Medication 1 TABLET(S): at 17:28

## 2017-03-25 RX ADMIN — SENNA PLUS 2 TABLET(S): 8.6 TABLET ORAL at 21:50

## 2017-03-25 RX ADMIN — Medication 20 MILLIEQUIVALENT(S): at 11:10

## 2017-03-25 RX ADMIN — Medication 0.5 MILLIGRAM(S): at 19:54

## 2017-03-25 RX ADMIN — Medication 1 TABLET(S): at 11:11

## 2017-03-25 RX ADMIN — POLYETHYLENE GLYCOL 3350 17 GRAM(S): 17 POWDER, FOR SOLUTION ORAL at 21:51

## 2017-03-25 RX ADMIN — Medication 81 MILLIGRAM(S): at 11:10

## 2017-03-25 RX ADMIN — Medication 20 MILLIGRAM(S): at 17:28

## 2017-03-25 RX ADMIN — Medication 25 MILLIGRAM(S): at 14:07

## 2017-03-26 LAB
ALBUMIN SERPL ELPH-MCNC: 2.2 G/DL — LOW (ref 3.3–5)
ALP SERPL-CCNC: 127 U/L — HIGH (ref 40–120)
ALT FLD-CCNC: 13 U/L — SIGNIFICANT CHANGE UP (ref 12–78)
ANION GAP SERPL CALC-SCNC: 4 MMOL/L — LOW (ref 5–17)
AST SERPL-CCNC: 22 U/L — SIGNIFICANT CHANGE UP (ref 15–37)
BILIRUB SERPL-MCNC: 0.6 MG/DL — SIGNIFICANT CHANGE UP (ref 0.2–1.2)
BUN SERPL-MCNC: 29 MG/DL — HIGH (ref 7–23)
CALCIUM SERPL-MCNC: 8 MG/DL — LOW (ref 8.5–10.1)
CHLORIDE SERPL-SCNC: 101 MMOL/L — SIGNIFICANT CHANGE UP (ref 96–108)
CO2 SERPL-SCNC: 37 MMOL/L — HIGH (ref 22–31)
CREAT SERPL-MCNC: 1.4 MG/DL — HIGH (ref 0.5–1.3)
GLUCOSE SERPL-MCNC: 76 MG/DL — SIGNIFICANT CHANGE UP (ref 70–99)
HCT VFR BLD CALC: 27.8 % — LOW (ref 34.5–45)
HGB BLD-MCNC: 8.2 G/DL — LOW (ref 11.5–15.5)
INR BLD: 1.86 RATIO — HIGH (ref 0.88–1.16)
MAGNESIUM SERPL-MCNC: 2.2 MG/DL — SIGNIFICANT CHANGE UP (ref 1.8–2.4)
MCHC RBC-ENTMCNC: 28.9 PG — SIGNIFICANT CHANGE UP (ref 27–34)
MCHC RBC-ENTMCNC: 29.5 GM/DL — LOW (ref 32–36)
MCV RBC AUTO: 98.1 FL — SIGNIFICANT CHANGE UP (ref 80–100)
PHOSPHATE SERPL-MCNC: 2.2 MG/DL — LOW (ref 2.5–4.5)
PLATELET # BLD AUTO: 241 K/UL — SIGNIFICANT CHANGE UP (ref 150–400)
POTASSIUM SERPL-MCNC: 4.1 MMOL/L — SIGNIFICANT CHANGE UP (ref 3.5–5.3)
POTASSIUM SERPL-SCNC: 4.1 MMOL/L — SIGNIFICANT CHANGE UP (ref 3.5–5.3)
PROT SERPL-MCNC: 5.6 G/DL — LOW (ref 6–8.3)
PROTHROM AB SERPL-ACNC: 20.5 SEC — HIGH (ref 9.8–12.7)
RBC # BLD: 2.83 M/UL — LOW (ref 3.8–5.2)
RBC # FLD: 16.6 % — HIGH (ref 10.3–14.5)
SODIUM SERPL-SCNC: 142 MMOL/L — SIGNIFICANT CHANGE UP (ref 135–145)
WBC # BLD: 5.1 K/UL — SIGNIFICANT CHANGE UP (ref 3.8–10.5)
WBC # FLD AUTO: 5.1 K/UL — SIGNIFICANT CHANGE UP (ref 3.8–10.5)

## 2017-03-26 RX ORDER — FUROSEMIDE 40 MG
40 TABLET ORAL
Qty: 0 | Refills: 0 | Status: DISCONTINUED | OUTPATIENT
Start: 2017-03-26 | End: 2017-03-28

## 2017-03-26 RX ORDER — WARFARIN SODIUM 2.5 MG/1
2 TABLET ORAL ONCE
Qty: 0 | Refills: 0 | Status: COMPLETED | OUTPATIENT
Start: 2017-03-26 | End: 2017-03-26

## 2017-03-26 RX ADMIN — Medication 0.5 MILLIGRAM(S): at 18:33

## 2017-03-26 RX ADMIN — GABAPENTIN 200 MILLIGRAM(S): 400 CAPSULE ORAL at 06:30

## 2017-03-26 RX ADMIN — ATORVASTATIN CALCIUM 40 MILLIGRAM(S): 80 TABLET, FILM COATED ORAL at 21:41

## 2017-03-26 RX ADMIN — Medication 1 TABLET(S): at 07:56

## 2017-03-26 RX ADMIN — Medication 100 MILLIGRAM(S): at 11:45

## 2017-03-26 RX ADMIN — Medication 50 MICROGRAM(S): at 06:30

## 2017-03-26 RX ADMIN — Medication 0.5 MILLIGRAM(S): at 08:22

## 2017-03-26 RX ADMIN — WARFARIN SODIUM 2 MILLIGRAM(S): 2.5 TABLET ORAL at 21:43

## 2017-03-26 RX ADMIN — Medication 25 MILLIGRAM(S): at 21:41

## 2017-03-26 RX ADMIN — Medication 20 MILLIGRAM(S): at 06:30

## 2017-03-26 RX ADMIN — Medication 3 MILLILITER(S): at 18:33

## 2017-03-26 RX ADMIN — Medication 81 MILLIGRAM(S): at 11:45

## 2017-03-26 RX ADMIN — MAGNESIUM HYDROXIDE 30 MILLILITER(S): 400 TABLET, CHEWABLE ORAL at 21:41

## 2017-03-26 RX ADMIN — Medication 3 MILLILITER(S): at 01:37

## 2017-03-26 RX ADMIN — Medication 100 MILLIGRAM(S): at 18:06

## 2017-03-26 RX ADMIN — SENNA PLUS 2 TABLET(S): 8.6 TABLET ORAL at 21:41

## 2017-03-26 RX ADMIN — Medication 20 MILLIEQUIVALENT(S): at 11:45

## 2017-03-26 RX ADMIN — Medication 25 MILLIGRAM(S): at 13:35

## 2017-03-26 RX ADMIN — Medication 0.12 MILLIGRAM(S): at 11:45

## 2017-03-26 RX ADMIN — Medication 3 MILLILITER(S): at 14:20

## 2017-03-26 RX ADMIN — PANTOPRAZOLE SODIUM 40 MILLIGRAM(S): 20 TABLET, DELAYED RELEASE ORAL at 11:45

## 2017-03-26 RX ADMIN — Medication 3 MILLILITER(S): at 08:21

## 2017-03-26 RX ADMIN — Medication 25 MILLIGRAM(S): at 06:30

## 2017-03-26 RX ADMIN — Medication 20 MILLIGRAM(S): at 18:06

## 2017-03-26 RX ADMIN — POLYETHYLENE GLYCOL 3350 17 GRAM(S): 17 POWDER, FOR SOLUTION ORAL at 21:41

## 2017-03-26 RX ADMIN — GABAPENTIN 200 MILLIGRAM(S): 400 CAPSULE ORAL at 18:06

## 2017-03-26 RX ADMIN — Medication 1 TABLET(S): at 11:45

## 2017-03-27 LAB
ALBUMIN SERPL ELPH-MCNC: 2.2 G/DL — LOW (ref 3.3–5)
ALP SERPL-CCNC: 144 U/L — HIGH (ref 40–120)
ALT FLD-CCNC: 12 U/L — SIGNIFICANT CHANGE UP (ref 12–78)
ANION GAP SERPL CALC-SCNC: 1 MMOL/L — LOW (ref 5–17)
AST SERPL-CCNC: 22 U/L — SIGNIFICANT CHANGE UP (ref 15–37)
BASOPHILS # BLD AUTO: 0 K/UL — SIGNIFICANT CHANGE UP (ref 0–0.2)
BASOPHILS NFR BLD AUTO: 0.8 % — SIGNIFICANT CHANGE UP (ref 0–2)
BILIRUB SERPL-MCNC: 0.5 MG/DL — SIGNIFICANT CHANGE UP (ref 0.2–1.2)
BUN SERPL-MCNC: 32 MG/DL — HIGH (ref 7–23)
CALCIUM SERPL-MCNC: 8 MG/DL — LOW (ref 8.5–10.1)
CHLORIDE SERPL-SCNC: 100 MMOL/L — SIGNIFICANT CHANGE UP (ref 96–108)
CO2 SERPL-SCNC: 41 MMOL/L — HIGH (ref 22–31)
CREAT SERPL-MCNC: 1.5 MG/DL — HIGH (ref 0.5–1.3)
EOSINOPHIL # BLD AUTO: 0 K/UL — SIGNIFICANT CHANGE UP (ref 0–0.5)
EOSINOPHIL NFR BLD AUTO: 0.8 % — SIGNIFICANT CHANGE UP (ref 0–6)
GLUCOSE SERPL-MCNC: 85 MG/DL — SIGNIFICANT CHANGE UP (ref 70–99)
HCT VFR BLD CALC: 28.4 % — LOW (ref 34.5–45)
HGB BLD-MCNC: 8.7 G/DL — LOW (ref 11.5–15.5)
INR BLD: 1.86 RATIO — HIGH (ref 0.88–1.16)
LYMPHOCYTES # BLD AUTO: 0.5 K/UL — LOW (ref 1–3.3)
LYMPHOCYTES # BLD AUTO: 8.8 % — LOW (ref 13–44)
MCHC RBC-ENTMCNC: 29.9 PG — SIGNIFICANT CHANGE UP (ref 27–34)
MCHC RBC-ENTMCNC: 30.5 GM/DL — LOW (ref 32–36)
MCV RBC AUTO: 98.1 FL — SIGNIFICANT CHANGE UP (ref 80–100)
MONOCYTES # BLD AUTO: 0.7 K/UL — SIGNIFICANT CHANGE UP (ref 0–0.9)
MONOCYTES NFR BLD AUTO: 11.8 % — HIGH (ref 1–9)
NEUTROPHILS # BLD AUTO: 4.4 K/UL — SIGNIFICANT CHANGE UP (ref 1.8–7.4)
NEUTROPHILS NFR BLD AUTO: 77.8 % — HIGH (ref 43–77)
PLATELET # BLD AUTO: 236 K/UL — SIGNIFICANT CHANGE UP (ref 150–400)
POTASSIUM SERPL-MCNC: 4.2 MMOL/L — SIGNIFICANT CHANGE UP (ref 3.5–5.3)
POTASSIUM SERPL-SCNC: 4.2 MMOL/L — SIGNIFICANT CHANGE UP (ref 3.5–5.3)
PROT SERPL-MCNC: 5.8 G/DL — LOW (ref 6–8.3)
PROTHROM AB SERPL-ACNC: 20.5 SEC — HIGH (ref 9.8–12.7)
RBC # BLD: 2.9 M/UL — LOW (ref 3.8–5.2)
RBC # FLD: 16.8 % — HIGH (ref 10.3–14.5)
SODIUM SERPL-SCNC: 142 MMOL/L — SIGNIFICANT CHANGE UP (ref 135–145)
WBC # BLD: 5.6 K/UL — SIGNIFICANT CHANGE UP (ref 3.8–10.5)
WBC # FLD AUTO: 5.6 K/UL — SIGNIFICANT CHANGE UP (ref 3.8–10.5)

## 2017-03-27 RX ORDER — WARFARIN SODIUM 2.5 MG/1
2 TABLET ORAL ONCE
Qty: 0 | Refills: 0 | Status: COMPLETED | OUTPATIENT
Start: 2017-03-27 | End: 2017-03-27

## 2017-03-27 RX ADMIN — Medication 3 MILLILITER(S): at 20:12

## 2017-03-27 RX ADMIN — Medication 0.5 MILLIGRAM(S): at 07:54

## 2017-03-27 RX ADMIN — Medication 25 MILLIGRAM(S): at 13:37

## 2017-03-27 RX ADMIN — ATORVASTATIN CALCIUM 40 MILLIGRAM(S): 80 TABLET, FILM COATED ORAL at 22:31

## 2017-03-27 RX ADMIN — Medication 50 MICROGRAM(S): at 05:44

## 2017-03-27 RX ADMIN — Medication 3 MILLILITER(S): at 07:54

## 2017-03-27 RX ADMIN — Medication 3 MILLILITER(S): at 02:13

## 2017-03-27 RX ADMIN — Medication 100 MILLIGRAM(S): at 18:27

## 2017-03-27 RX ADMIN — Medication 81 MILLIGRAM(S): at 12:21

## 2017-03-27 RX ADMIN — Medication 1 TABLET(S): at 12:21

## 2017-03-27 RX ADMIN — PANTOPRAZOLE SODIUM 40 MILLIGRAM(S): 20 TABLET, DELAYED RELEASE ORAL at 12:21

## 2017-03-27 RX ADMIN — POLYETHYLENE GLYCOL 3350 17 GRAM(S): 17 POWDER, FOR SOLUTION ORAL at 22:31

## 2017-03-27 RX ADMIN — SENNA PLUS 2 TABLET(S): 8.6 TABLET ORAL at 22:31

## 2017-03-27 RX ADMIN — Medication 650 MILLIGRAM(S): at 18:31

## 2017-03-27 RX ADMIN — Medication 25 MILLIGRAM(S): at 22:31

## 2017-03-27 RX ADMIN — WARFARIN SODIUM 2 MILLIGRAM(S): 2.5 TABLET ORAL at 22:31

## 2017-03-27 RX ADMIN — GABAPENTIN 200 MILLIGRAM(S): 400 CAPSULE ORAL at 18:27

## 2017-03-27 RX ADMIN — Medication 40 MILLIGRAM(S): at 05:44

## 2017-03-27 RX ADMIN — Medication 0.5 MILLIGRAM(S): at 20:12

## 2017-03-27 RX ADMIN — Medication 25 MILLIGRAM(S): at 05:44

## 2017-03-27 RX ADMIN — Medication 20 MILLIEQUIVALENT(S): at 12:21

## 2017-03-27 RX ADMIN — Medication 100 MILLIGRAM(S): at 12:21

## 2017-03-27 RX ADMIN — Medication 3 MILLILITER(S): at 14:03

## 2017-03-27 RX ADMIN — GABAPENTIN 200 MILLIGRAM(S): 400 CAPSULE ORAL at 05:44

## 2017-03-28 ENCOUNTER — TRANSCRIPTION ENCOUNTER (OUTPATIENT)
Age: 82
End: 2017-03-28

## 2017-03-28 VITALS
OXYGEN SATURATION: 100 % | TEMPERATURE: 98 F | SYSTOLIC BLOOD PRESSURE: 115 MMHG | DIASTOLIC BLOOD PRESSURE: 69 MMHG | RESPIRATION RATE: 15 BRPM | HEART RATE: 60 BPM

## 2017-03-28 LAB
ALBUMIN SERPL ELPH-MCNC: 2.4 G/DL — LOW (ref 3.3–5)
ALP SERPL-CCNC: 151 U/L — HIGH (ref 40–120)
ALT FLD-CCNC: 15 U/L — SIGNIFICANT CHANGE UP (ref 12–78)
ANION GAP SERPL CALC-SCNC: 4 MMOL/L — LOW (ref 5–17)
AST SERPL-CCNC: 24 U/L — SIGNIFICANT CHANGE UP (ref 15–37)
BILIRUB SERPL-MCNC: 0.5 MG/DL — SIGNIFICANT CHANGE UP (ref 0.2–1.2)
BUN SERPL-MCNC: 37 MG/DL — HIGH (ref 7–23)
CALCIUM SERPL-MCNC: 8.4 MG/DL — LOW (ref 8.5–10.1)
CHLORIDE SERPL-SCNC: 100 MMOL/L — SIGNIFICANT CHANGE UP (ref 96–108)
CO2 SERPL-SCNC: 38 MMOL/L — HIGH (ref 22–31)
CREAT SERPL-MCNC: 1.6 MG/DL — HIGH (ref 0.5–1.3)
GLUCOSE SERPL-MCNC: 93 MG/DL — SIGNIFICANT CHANGE UP (ref 70–99)
HCT VFR BLD CALC: 27.9 % — LOW (ref 34.5–45)
HGB BLD-MCNC: 8.2 G/DL — LOW (ref 11.5–15.5)
INR BLD: 2.01 RATIO — HIGH (ref 0.88–1.16)
MCHC RBC-ENTMCNC: 29.5 GM/DL — LOW (ref 32–36)
MCHC RBC-ENTMCNC: 29.8 PG — SIGNIFICANT CHANGE UP (ref 27–34)
MCV RBC AUTO: 100.8 FL — HIGH (ref 80–100)
PLATELET # BLD AUTO: 228 K/UL — SIGNIFICANT CHANGE UP (ref 150–400)
POTASSIUM SERPL-MCNC: 4.4 MMOL/L — SIGNIFICANT CHANGE UP (ref 3.5–5.3)
POTASSIUM SERPL-SCNC: 4.4 MMOL/L — SIGNIFICANT CHANGE UP (ref 3.5–5.3)
PROT SERPL-MCNC: 5.9 G/DL — LOW (ref 6–8.3)
PROTHROM AB SERPL-ACNC: 22.2 SEC — HIGH (ref 9.8–12.7)
RBC # BLD: 2.77 M/UL — LOW (ref 3.8–5.2)
RBC # FLD: 16.9 % — HIGH (ref 10.3–14.5)
SODIUM SERPL-SCNC: 142 MMOL/L — SIGNIFICANT CHANGE UP (ref 135–145)
WBC # BLD: 5.5 K/UL — SIGNIFICANT CHANGE UP (ref 3.8–10.5)
WBC # FLD AUTO: 5.5 K/UL — SIGNIFICANT CHANGE UP (ref 3.8–10.5)

## 2017-03-28 PROCEDURE — 84145 PROCALCITONIN (PCT): CPT

## 2017-03-28 PROCEDURE — 94667 MNPJ CHEST WALL 1ST: CPT

## 2017-03-28 PROCEDURE — 80076 HEPATIC FUNCTION PANEL: CPT

## 2017-03-28 PROCEDURE — 84134 ASSAY OF PREALBUMIN: CPT

## 2017-03-28 PROCEDURE — 85730 THROMBOPLASTIN TIME PARTIAL: CPT

## 2017-03-28 PROCEDURE — P9059: CPT

## 2017-03-28 PROCEDURE — 97110 THERAPEUTIC EXERCISES: CPT

## 2017-03-28 PROCEDURE — 81001 URINALYSIS AUTO W/SCOPE: CPT

## 2017-03-28 PROCEDURE — 74230 X-RAY XM SWLNG FUNCJ C+: CPT

## 2017-03-28 PROCEDURE — 36430 TRANSFUSION BLD/BLD COMPNT: CPT

## 2017-03-28 PROCEDURE — 94002 VENT MGMT INPAT INIT DAY: CPT

## 2017-03-28 PROCEDURE — 87040 BLOOD CULTURE FOR BACTERIA: CPT

## 2017-03-28 PROCEDURE — 94760 N-INVAS EAR/PLS OXIMETRY 1: CPT

## 2017-03-28 PROCEDURE — 85027 COMPLETE CBC AUTOMATED: CPT

## 2017-03-28 PROCEDURE — 83605 ASSAY OF LACTIC ACID: CPT

## 2017-03-28 PROCEDURE — 99291 CRITICAL CARE FIRST HOUR: CPT | Mod: 25

## 2017-03-28 PROCEDURE — 82570 ASSAY OF URINE CREATININE: CPT

## 2017-03-28 PROCEDURE — 82550 ASSAY OF CK (CPK): CPT

## 2017-03-28 PROCEDURE — 84100 ASSAY OF PHOSPHORUS: CPT

## 2017-03-28 PROCEDURE — 99221 1ST HOSP IP/OBS SF/LOW 40: CPT

## 2017-03-28 PROCEDURE — 80299 QUANTITATIVE ASSAY DRUG: CPT

## 2017-03-28 PROCEDURE — 83735 ASSAY OF MAGNESIUM: CPT

## 2017-03-28 PROCEDURE — A9698: CPT

## 2017-03-28 PROCEDURE — 93005 ELECTROCARDIOGRAM TRACING: CPT

## 2017-03-28 PROCEDURE — 80053 COMPREHEN METABOLIC PANEL: CPT

## 2017-03-28 PROCEDURE — 86900 BLOOD TYPING SEROLOGIC ABO: CPT

## 2017-03-28 PROCEDURE — 86920 COMPATIBILITY TEST SPIN: CPT

## 2017-03-28 PROCEDURE — 86850 RBC ANTIBODY SCREEN: CPT

## 2017-03-28 PROCEDURE — 82553 CREATINE MB FRACTION: CPT

## 2017-03-28 PROCEDURE — 82803 BLOOD GASES ANY COMBINATION: CPT

## 2017-03-28 PROCEDURE — 83880 ASSAY OF NATRIURETIC PEPTIDE: CPT

## 2017-03-28 PROCEDURE — 80202 ASSAY OF VANCOMYCIN: CPT

## 2017-03-28 PROCEDURE — 97530 THERAPEUTIC ACTIVITIES: CPT

## 2017-03-28 PROCEDURE — 94668 MNPJ CHEST WALL SBSQ: CPT

## 2017-03-28 PROCEDURE — 84484 ASSAY OF TROPONIN QUANT: CPT

## 2017-03-28 PROCEDURE — 71250 CT THORAX DX C-: CPT

## 2017-03-28 PROCEDURE — 87086 URINE CULTURE/COLONY COUNT: CPT

## 2017-03-28 PROCEDURE — 94640 AIRWAY INHALATION TREATMENT: CPT

## 2017-03-28 PROCEDURE — 80048 BASIC METABOLIC PNL TOTAL CA: CPT

## 2017-03-28 PROCEDURE — P9016: CPT

## 2017-03-28 PROCEDURE — 86901 BLOOD TYPING SEROLOGIC RH(D): CPT

## 2017-03-28 PROCEDURE — 93306 TTE W/DOPPLER COMPLETE: CPT

## 2017-03-28 PROCEDURE — 71045 X-RAY EXAM CHEST 1 VIEW: CPT

## 2017-03-28 PROCEDURE — 94799 UNLISTED PULMONARY SVC/PX: CPT

## 2017-03-28 PROCEDURE — 83036 HEMOGLOBIN GLYCOSYLATED A1C: CPT

## 2017-03-28 PROCEDURE — 84300 ASSAY OF URINE SODIUM: CPT

## 2017-03-28 PROCEDURE — 76775 US EXAM ABDO BACK WALL LIM: CPT

## 2017-03-28 PROCEDURE — 80162 ASSAY OF DIGOXIN TOTAL: CPT

## 2017-03-28 PROCEDURE — 97162 PT EVAL MOD COMPLEX 30 MIN: CPT

## 2017-03-28 PROCEDURE — 97116 GAIT TRAINING THERAPY: CPT

## 2017-03-28 PROCEDURE — 85610 PROTHROMBIN TIME: CPT

## 2017-03-28 PROCEDURE — 94003 VENT MGMT INPAT SUBQ DAY: CPT

## 2017-03-28 PROCEDURE — 96365 THER/PROPH/DIAG IV INF INIT: CPT

## 2017-03-28 RX ORDER — ATORVASTATIN CALCIUM 80 MG/1
1 TABLET, FILM COATED ORAL
Qty: 0 | Refills: 0 | COMMUNITY
Start: 2017-03-28

## 2017-03-28 RX ORDER — DIGOXIN 250 MCG
1 TABLET ORAL
Qty: 0 | Refills: 0 | COMMUNITY
Start: 2017-03-28

## 2017-03-28 RX ORDER — IPRATROPIUM/ALBUTEROL SULFATE 18-103MCG
0 AEROSOL WITH ADAPTER (GRAM) INHALATION
Qty: 0 | Refills: 0 | COMMUNITY

## 2017-03-28 RX ORDER — MAGNESIUM HYDROXIDE 400 MG/1
30 TABLET, CHEWABLE ORAL
Qty: 0 | Refills: 0 | COMMUNITY
Start: 2017-03-28

## 2017-03-28 RX ORDER — POTASSIUM CHLORIDE 20 MEQ
1 PACKET (EA) ORAL
Qty: 0 | Refills: 0 | COMMUNITY
Start: 2017-03-28

## 2017-03-28 RX ORDER — DOCUSATE SODIUM 100 MG
1 CAPSULE ORAL
Qty: 0 | Refills: 0 | COMMUNITY
Start: 2017-03-28

## 2017-03-28 RX ORDER — GABAPENTIN 400 MG/1
2 CAPSULE ORAL
Qty: 0 | Refills: 0 | COMMUNITY

## 2017-03-28 RX ORDER — DOCUSATE SODIUM 100 MG
0 CAPSULE ORAL
Qty: 0 | Refills: 0 | COMMUNITY

## 2017-03-28 RX ORDER — METOPROLOL TARTRATE 50 MG
1 TABLET ORAL
Qty: 0 | Refills: 0 | COMMUNITY
Start: 2017-03-28

## 2017-03-28 RX ORDER — POLYETHYLENE GLYCOL 3350 17 G/17G
17 POWDER, FOR SOLUTION ORAL
Qty: 0 | Refills: 0 | COMMUNITY
Start: 2017-03-28

## 2017-03-28 RX ORDER — SENNA PLUS 8.6 MG/1
2 TABLET ORAL
Qty: 0 | Refills: 0 | COMMUNITY
Start: 2017-03-28

## 2017-03-28 RX ORDER — ASPIRIN/CALCIUM CARB/MAGNESIUM 324 MG
1 TABLET ORAL
Qty: 0 | Refills: 0 | COMMUNITY
Start: 2017-03-28

## 2017-03-28 RX ORDER — FUROSEMIDE 40 MG
1 TABLET ORAL
Qty: 0 | Refills: 0 | COMMUNITY

## 2017-03-28 RX ORDER — FUROSEMIDE 40 MG
1 TABLET ORAL
Qty: 0 | Refills: 0 | COMMUNITY
Start: 2017-03-28

## 2017-03-28 RX ORDER — ALLOPURINOL 300 MG
1 TABLET ORAL
Qty: 0 | Refills: 0 | COMMUNITY
Start: 2017-03-28

## 2017-03-28 RX ORDER — ACETAMINOPHEN 500 MG
2 TABLET ORAL
Qty: 0 | Refills: 0 | COMMUNITY
Start: 2017-03-28

## 2017-03-28 RX ORDER — IPRATROPIUM/ALBUTEROL SULFATE 18-103MCG
3 AEROSOL WITH ADAPTER (GRAM) INHALATION
Qty: 0 | Refills: 0 | COMMUNITY
Start: 2017-03-28

## 2017-03-28 RX ORDER — GABAPENTIN 400 MG/1
2 CAPSULE ORAL
Qty: 0 | Refills: 0 | COMMUNITY
Start: 2017-03-28

## 2017-03-28 RX ADMIN — GABAPENTIN 200 MILLIGRAM(S): 400 CAPSULE ORAL at 05:58

## 2017-03-28 RX ADMIN — Medication 40 MILLIGRAM(S): at 05:58

## 2017-03-28 RX ADMIN — Medication 100 MILLIGRAM(S): at 13:42

## 2017-03-28 RX ADMIN — Medication 1 TABLET(S): at 13:42

## 2017-03-28 RX ADMIN — Medication 100 MILLIGRAM(S): at 05:58

## 2017-03-28 RX ADMIN — Medication 3 MILLILITER(S): at 08:02

## 2017-03-28 RX ADMIN — Medication 3 MILLILITER(S): at 14:08

## 2017-03-28 RX ADMIN — Medication 3 MILLILITER(S): at 02:40

## 2017-03-28 RX ADMIN — Medication 25 MILLIGRAM(S): at 05:58

## 2017-03-28 RX ADMIN — Medication 81 MILLIGRAM(S): at 13:42

## 2017-03-28 RX ADMIN — Medication 0.5 MILLIGRAM(S): at 08:02

## 2017-03-28 RX ADMIN — PANTOPRAZOLE SODIUM 40 MILLIGRAM(S): 20 TABLET, DELAYED RELEASE ORAL at 13:42

## 2017-03-28 RX ADMIN — Medication 50 MICROGRAM(S): at 05:58

## 2017-03-28 RX ADMIN — Medication 20 MILLIEQUIVALENT(S): at 13:41

## 2017-03-28 NOTE — DISCHARGE NOTE ADULT - CARE PLAN
Principal Discharge DX:	Chronic respiratory failure with hypoxia  Goal:	resolution of sob  Instructions for follow-up, activity and diet:	continue current managmnet and medications ,followup with PMD.PULM AND ENT  Secondary Diagnosis:	CHF (congestive heart failure)  Instructions for follow-up, activity and diet:	Recommended low salt,lowfat diet, continue current cardiac meds, f/,up with cardilogist,, BP control and monitoring, Echocardiogramm every 6-12 mnth to evaluate LV ef, monitor fluid status,electrolytes  and renal profile closely due to diuretics administartion. Weight control, notify physicain about weight gain 2lbs in 2-3 days  Secondary Diagnosis:	Dysphagia, unspecified type  Instructions for follow-up, activity and diet:	aspiration precautions  Secondary Diagnosis:	HTN (hypertension)  Instructions for follow-up, activity and diet:	BP monitoring,continue current antihypertensive meds, low salt diet,followup with PMD in 1-2 weeks  Secondary Diagnosis:	Pacemaker  Instructions for follow-up, activity and diet:	continue current managmnet and medications  Secondary Diagnosis:	Mitral valve replaced  Instructions for follow-up, activity and diet:	continue current managmnet and medications  Secondary Diagnosis:	GERD (gastroesophageal reflux disease)  Instructions for follow-up, activity and diet:	continue current managmnet and medications

## 2017-03-28 NOTE — DISCHARGE NOTE ADULT - PATIENT PORTAL LINK FT
“You can access the Follow[facility] Patient Portal, offered by [facility], by registering with the following website: [email or web address]”

## 2017-03-28 NOTE — DISCHARGE NOTE ADULT - HOSPITAL COURSE
pateint is 83 year old with past medical Afib  ,  Anemia, unspecified type  ,  Bell's palsy  ,  CHF (congestive heart   failure)  ,   Chronic respiratory failure with hypoxia  ,  Depression  ,  Dysphagia, unspecified type    Gastrostomy in place    ,  GERD (gastroesophageal reflux disease)  ,  HTN (hypertension)  ,  Hypothyroid    Pacemaker  ,  PICC line infection, initial encounter  ,  PNA (pneumonia)  ,   Sepsis  ,  Stroke  ,  Tracheostomy in   place  ,   Uterine cancer  ,   Vocal cord paralysis syndrome  ,  H/O tracheostomy  ,  Mitral valve replaced    Status post right hip replacement  ,  Status post tracheostomy ,  came via  ems from SNF for resp distress. pt s/p   trach, nonverbal, unable to provide history with sig difficulty of breathing and sortness of breath Admit to monitored unit for cardiac monitoring,obtain echo to evaluate LVEF,intravenous diuresis,monitor ins/outs,monitor renal profile and electrolytes closely,cardiology consult,send 3 sets of ensymes,O2 supply,serial chest xrays,monitor weights and oral intake of fluids,nutritionist consult Recommended low salt,lowfat diet, continue current cardiac meds, f/,up with cardilogist,, BP control and monitoring, Echocardiogramm every 6-12 mnth to evaluate LV ef, monitor fluid status,electrolytes  and renal profile closely due to diuretics administartion. Weight control, notify physicain about weight gain 2lbs in 2-3 days Trach was replaced by ent prior to discharged Followup with pmd,pulm and ent is recommended

## 2017-03-28 NOTE — DISCHARGE NOTE ADULT - SECONDARY DIAGNOSIS.
CHF (congestive heart failure) Dysphagia, unspecified type HTN (hypertension) Pacemaker Mitral valve replaced GERD (gastroesophageal reflux disease)

## 2017-03-28 NOTE — DISCHARGE NOTE ADULT - PLAN OF CARE
resolution of sob continue current managmnet and medications ,followup with PMD.PULM AND ENT Recommended low salt,lowfat diet, continue current cardiac meds, f/,up with cardilogist,, BP control and monitoring, Echocardiogramm every 6-12 mnth to evaluate LV ef, monitor fluid status,electrolytes  and renal profile closely due to diuretics administartion. Weight control, notify physicain about weight gain 2lbs in 2-3 days aspiration precautions BP monitoring,continue current antihypertensive meds, low salt diet,followup with PMD in 1-2 weeks continue current managmnet and medications

## 2017-03-28 NOTE — DISCHARGE NOTE ADULT - MEDICATION SUMMARY - MEDICATIONS TO TAKE
I will START or STAY ON the medications listed below when I get home from the hospital:    budesonide 0.5 mg/2 mL inhalation suspension  -- 2 milliliter(s) inhaled 2 times a day  -- Indication: For Copd    acetaminophen 325 mg oral tablet  -- 2 tab(s) by mouth every 6 hours, As needed, For Temp greater than 38.5 C (101.3 F)  -- Indication: For fever    acetaminophen 325 mg oral tablet  -- 2 tab(s) by mouth every 6 hours, As needed, Mild Pain (1 - 3)  -- Indication: For Pain    aspirin 81 mg oral tablet, chewable  -- 1 tab(s) by mouth once a day  -- Indication: For Cad    aluminum hydroxide-magnesium hydroxide 200 mg-200 mg/5 mL oral suspension  -- 30 milliliter(s) by mouth every 4 hours, As needed, Dyspepsia  -- Indication: For mom    magnesium hydroxide 8% oral suspension  -- 30 milliliter(s) by mouth once a day, As needed, Constipation  -- Indication: For Constipation    digoxin 125 mcg (0.125 mg) oral tablet  -- 1 tab(s) by mouth every other day  -- Indication: For CHF (congestive heart failure)    Coumadin 1 mg oral tablet  -- 1 tab(s) by mouth once a day hold for INR >3  -- Indication: For Afib    gabapentin 100 mg oral capsule  -- 2 cap(s) by mouth 2 times a day  -- Indication: For Pain    allopurinol 100 mg oral tablet  -- 1 tab(s) by mouth once a day  -- Indication: For gout    atorvastatin 40 mg oral tablet  -- 1 tab(s) by mouth once a day (at bedtime)  -- Indication: For Hld    metoprolol tartrate 25 mg oral tablet  -- 1 tab(s) by mouth 3 times a day  -- Indication: For HTN (hypertension)    albuterol-ipratropium 2.5 mg-0.5 mg/3 mL inhalation solution  -- 3 milliliter(s) inhaled every 6 hours  -- Indication: For Copd    furosemide 40 mg oral tablet  -- 1 tab(s) by mouth 2 times a day  -- Indication: For CHF (congestive heart failure)    docusate sodium 100 mg oral capsule  -- 1 cap(s) by mouth 2 times a day  -- Indication: For Constipation    polyethylene glycol 3350 oral powder for reconstitution  -- 17 gram(s) by mouth once a day (at bedtime)  -- Indication: For Constipation    senna oral tablet  -- 2 tab(s) by mouth once a day (at bedtime)  -- Indication: For Constipation    potassium chloride 20 mEq oral tablet, extended release  -- 1 tab(s) by mouth once a day  -- Indication: For Supplement    pantoprazole 40 mg oral delayed release tablet  -- 1 tab(s) by mouth once a day  -- Indication: For gerd    levothyroxine 50 mcg (0.05 mg) oral tablet  -- 1 tab(s) by mouth once a day  -- Indication: For Hypothyroidism    Multiple Vitamins oral tablet  -- 1 tab(s) by mouth once a day  -- Indication: For Supplement

## 2017-03-30 DIAGNOSIS — E87.6 HYPOKALEMIA: ICD-10-CM

## 2017-03-30 DIAGNOSIS — M84.48XA PATHOLOGICAL FRACTURE, OTHER SITE, INITIAL ENCOUNTER FOR FRACTURE: ICD-10-CM

## 2017-03-30 DIAGNOSIS — I50.33 ACUTE ON CHRONIC DIASTOLIC (CONGESTIVE) HEART FAILURE: ICD-10-CM

## 2017-03-30 DIAGNOSIS — D69.6 THROMBOCYTOPENIA, UNSPECIFIED: ICD-10-CM

## 2017-03-30 DIAGNOSIS — A41.9 SEPSIS, UNSPECIFIED ORGANISM: ICD-10-CM

## 2017-03-30 DIAGNOSIS — Z95.3 PRESENCE OF XENOGENIC HEART VALVE: ICD-10-CM

## 2017-03-30 DIAGNOSIS — Z86.73 PERSONAL HISTORY OF TRANSIENT ISCHEMIC ATTACK (TIA), AND CEREBRAL INFARCTION WITHOUT RESIDUAL DEFICITS: ICD-10-CM

## 2017-03-30 DIAGNOSIS — R65.21 SEVERE SEPSIS WITH SEPTIC SHOCK: ICD-10-CM

## 2017-03-30 DIAGNOSIS — Z79.82 LONG TERM (CURRENT) USE OF ASPIRIN: ICD-10-CM

## 2017-03-30 DIAGNOSIS — J96.22 ACUTE AND CHRONIC RESPIRATORY FAILURE WITH HYPERCAPNIA: ICD-10-CM

## 2017-03-30 DIAGNOSIS — E87.0 HYPEROSMOLALITY AND HYPERNATREMIA: ICD-10-CM

## 2017-03-30 DIAGNOSIS — Z93.0 TRACHEOSTOMY STATUS: ICD-10-CM

## 2017-03-30 DIAGNOSIS — Z79.01 LONG TERM (CURRENT) USE OF ANTICOAGULANTS: ICD-10-CM

## 2017-03-30 DIAGNOSIS — I27.2 OTHER SECONDARY PULMONARY HYPERTENSION: ICD-10-CM

## 2017-03-30 DIAGNOSIS — D53.9 NUTRITIONAL ANEMIA, UNSPECIFIED: ICD-10-CM

## 2017-03-30 DIAGNOSIS — E83.39 OTHER DISORDERS OF PHOSPHORUS METABOLISM: ICD-10-CM

## 2017-03-30 DIAGNOSIS — N17.9 ACUTE KIDNEY FAILURE, UNSPECIFIED: ICD-10-CM

## 2017-03-30 DIAGNOSIS — I13.0 HYPERTENSIVE HEART AND CHRONIC KIDNEY DISEASE WITH HEART FAILURE AND STAGE 1 THROUGH STAGE 4 CHRONIC KIDNEY DISEASE, OR UNSPECIFIED CHRONIC KIDNEY DISEASE: ICD-10-CM

## 2017-03-30 DIAGNOSIS — E03.9 HYPOTHYROIDISM, UNSPECIFIED: ICD-10-CM

## 2017-03-30 DIAGNOSIS — I35.8 OTHER NONRHEUMATIC AORTIC VALVE DISORDERS: ICD-10-CM

## 2017-03-30 DIAGNOSIS — I36.1 NONRHEUMATIC TRICUSPID (VALVE) INSUFFICIENCY: ICD-10-CM

## 2017-03-30 DIAGNOSIS — D63.8 ANEMIA IN OTHER CHRONIC DISEASES CLASSIFIED ELSEWHERE: ICD-10-CM

## 2017-03-30 DIAGNOSIS — Z79.52 LONG TERM (CURRENT) USE OF SYSTEMIC STEROIDS: ICD-10-CM

## 2017-03-30 DIAGNOSIS — J38.00 PARALYSIS OF VOCAL CORDS AND LARYNX, UNSPECIFIED: ICD-10-CM

## 2017-03-30 DIAGNOSIS — J96.21 ACUTE AND CHRONIC RESPIRATORY FAILURE WITH HYPOXIA: ICD-10-CM

## 2017-03-30 DIAGNOSIS — R04.2 HEMOPTYSIS: ICD-10-CM

## 2017-03-30 DIAGNOSIS — G51.0 BELL'S PALSY: ICD-10-CM

## 2017-03-30 DIAGNOSIS — I24.8 OTHER FORMS OF ACUTE ISCHEMIC HEART DISEASE: ICD-10-CM

## 2017-03-30 DIAGNOSIS — J18.9 PNEUMONIA, UNSPECIFIED ORGANISM: ICD-10-CM

## 2017-03-30 DIAGNOSIS — J98.11 ATELECTASIS: ICD-10-CM

## 2017-03-30 DIAGNOSIS — I48.91 UNSPECIFIED ATRIAL FIBRILLATION: ICD-10-CM

## 2017-03-30 DIAGNOSIS — Z96.641 PRESENCE OF RIGHT ARTIFICIAL HIP JOINT: ICD-10-CM

## 2017-03-30 DIAGNOSIS — Z66 DO NOT RESUSCITATE: ICD-10-CM

## 2017-03-30 DIAGNOSIS — Z28.21 IMMUNIZATION NOT CARRIED OUT BECAUSE OF PATIENT REFUSAL: ICD-10-CM

## 2017-03-30 DIAGNOSIS — K59.00 CONSTIPATION, UNSPECIFIED: ICD-10-CM

## 2017-03-30 DIAGNOSIS — Z95.0 PRESENCE OF CARDIAC PACEMAKER: ICD-10-CM

## 2017-03-30 DIAGNOSIS — E87.5 HYPERKALEMIA: ICD-10-CM

## 2017-03-30 DIAGNOSIS — Z93.1 GASTROSTOMY STATUS: ICD-10-CM

## 2017-03-30 DIAGNOSIS — K21.9 GASTRO-ESOPHAGEAL REFLUX DISEASE WITHOUT ESOPHAGITIS: ICD-10-CM

## 2017-03-30 DIAGNOSIS — N18.3 CHRONIC KIDNEY DISEASE, STAGE 3 (MODERATE): ICD-10-CM

## 2017-03-30 DIAGNOSIS — F32.9 MAJOR DEPRESSIVE DISORDER, SINGLE EPISODE, UNSPECIFIED: ICD-10-CM

## 2017-03-30 DIAGNOSIS — Z85.42 PERSONAL HISTORY OF MALIGNANT NEOPLASM OF OTHER PARTS OF UTERUS: ICD-10-CM

## 2017-04-07 NOTE — DISCHARGE NOTE ADULT - PHYSICIAN SECTION COMPLETE
Follow up visit with patient at request of patient whose  was present and Ms. Logan wanted to introduce the  to her . Sahilmorgan Colmenares visited with patient yesterday and is not here today. Ms. Dhruv Rene expressed understanding and appeared just happy to introduce me to her . Grary Christopher from [facility] in [place], [place] was visiting with his wife with patient. Ms. Dhruv Rene is a member there and shared how thankful and appreciative of her . His number is [phone] and he and his wife are very supportive of patient. I remained after  left and visited with Ms. Dhruv Rene who expresses feelings of being very lonely. She shared of multiple losses in her life, including the loss of two children; one son last year. She has five remaining children. I offered presence and support. Will refer patient to be visited by spiritual care partners daily as patient will benefit from presence and just wants to have someone to talk to as she is lonely. Spiritual care will continue to follow. Please page as needed/desired. [phone]. Visit by: Majo Che. Cristino Alvarado.  Emely Weiner MA, [facility]    Lead  Profession Development & Advancement Yes

## 2017-11-15 NOTE — PHYSICAL THERAPY INITIAL EVALUATION ADULT - LEVEL OF INDEPENDENCE: SUPINE/SIT, REHAB EVAL
Group Topic: Behavioral Activation Group    Start Time: 3:00 PM  End Time: 4:00 PM    Focus: Goals  Number in attendance: 10      Attendance: Present  Patient Response: Appropriate feedback, Attentive, Good eye contact and Interactive    Pt’s long term goal is to continue to utilize the coping skills learned in group.     
Group Topic: Coping Skills Education    Start Time: 1:00 PM  End Time: 2:00 PM    Focus: Building and Ending Relationships   Number in attendance: 10    Attendance: Present  Patient Response: Attentive, Nonverbal feedback and Quiet    A presentation was given on strategies tolerate an emotional distressing moment by finding new relationships as opposed to isolating and feeling lonely, and ending unhealthy destructive relationships. Pt was quiet during the presentation. However, pt was attentive and displayed nonverbal feedback by nodding in agreement to what his peers shared.      
Group Topic: Monitoring Safety and Relapse    Start Time: 1:00 PM  End Time: 4:00 PM    Number in attendance: 10       Safety Concerns: Suicidal ideation and Urge to harm self  Types of Safety Concerns: SI: 1/10, UHS: 1/10    Pt reported no change with his SI rating and an increase urge to harm self compared to the previous session. Pt shared that various stressors attributed to his increase urge to harm self. Pt indicated that he has no intent or plan. Pt stated that he will keep himself safe and will reach out for help by using the resources provided to him when needed.         
Group Topic: Process Group    Start Time: 2:00 PM  End Time: 3:00 PM    Focus: Discharge  Number in attendance: 10      Attendance: Present  Response Communication: Appropriate topic  MoodAffect: Appropriate to group  Behavior/Socialization: Appropriate to group and Provided feedback to peers  Thought Process: Appropriate  Patient Response: Appropriate feedback, Attentive, Good eye contact and Interactive    Today is pt’s last day in group. Pt reported that he completed his goals from the previous session. Pt shared that is takeaways from group was learning about the various coping skills. Pt indicated that the mindfulness skills and the reality acceptance skills were most helpful to him. Group provided pt with feedback, farewells, and support. Pt was receptive to the feedback that he received.     
minimum assist (75% patients effort)

## 2018-04-23 LAB
DIGITOXIN REPORTING LIMIT: 5 NG/ML — SIGNIFICANT CHANGE UP
DIGITOXIN SERPL-MCNC: SIGNIFICANT CHANGE UP NG/ML (ref 10–30)

## 2019-04-28 NOTE — DISCHARGE NOTE ADULT - MEDICATION SUMMARY - MEDICATIONS TO STOP TAKING
DISPLAY PLAN FREE TEXT I will STOP taking the medications listed below when I get home from the hospital:    potassium chloride 10 mEq oral capsule, extended release  -- 1 cap(s) by mouth 2 times a day    Pepcid 40 mg/5 mL oral liquid  -- 5 milliliter(s) by mouth once a day (at bedtime)    Tylenol  -- 650 milligram(s) by gastrostomy tube every 6 hours, As Needed    epoetin bacilio  -- 79732 unit(s) subcutaneous 3 times a week    amoxicillin-clavulanate 875 mg-125 mg oral tablet  -- 1 tab(s) by mouth 2 times a day    acetaminophen 160 mg/5 mL oral suspension  -- 650 milligram(s) by mouth every 6 hours, As Needed -For Temp greater than 38 C (100.4 F)    acetaminophen 160 mg/5 mL oral suspension  -- 20.31 milliliter(s) by mouth every 6 hours, As needed, Mild Pain (1 - 3)    enoxaparin  -- 55 milligram(s) subcutaneous once a day  -- Stop once INR >2.0    chlorhexidine 0.12% mucous membrane liquid  -- 15 milliliter(s) mucous membrane 2 times a day  -- swish and spit    bacitracin 500 units/g topical ointment  -- 1 application on skin 3 times a day    amoxicillin-clavulanate 500 mg-125 mg oral tablet  -- 1 tab(s) by mouth every 12 hours  -- for 7 days then stop.    potassium chloride 10 mEq oral capsule, extended release  -- 1 cap(s) by mouth 2 times a day    epoetin bacilio  -- 61701 unit(s) subcutaneous Monday, Wednesday, and Friday  -- Hold if Hgb >10.5

## 2019-07-05 NOTE — ED PROVIDER NOTE - CHIEF COMPLAINT
12:30 Called mom to let her know strep test was negative//CORNELL  
The patient is a 83y Female complaining of difficulty breathing.

## 2019-08-26 NOTE — PHYSICAL THERAPY INITIAL EVALUATION ADULT - ASSISTIVE DEVICE FOR TRANSFER: SIT/STAND, REHAB EVAL
Wife called states pt has gained 10 lbs in the last few weeks  Worsening of SOB and BLLE edema  Could not ask any other question because her is not working correctly  Pt states he was taking extra 20 mg lasix QOD since 8/12    Wt today 209 2    Last labs 6/7  CR-1 46  K-4 5      Taking: Lasix 20 mg qd        Please advise rolling walker

## 2020-01-15 NOTE — PHARMACY COMMUNICATION NOTE - COMMENTS
Medication is currently unavailable today 1/5, and on order for 1/6. Dr. Melgar aware, ok to start treatment 1/6. Medication is currently unavailable today 1/5, and on order for 1/6. Dr. Melgar aware, ok to start treatment 1/6. RN aware. 9

## 2021-03-09 NOTE — DISCHARGE NOTE ADULT - FUNCTIONAL SCREEN CURRENT LEVEL: BATHING, MLM
Child's Well Visit, 9 to 10 Months: Care Instructions Your Care Instructions Most babies at 5to 5 months of age are exploring the world around them. Your baby is familiar with you and with people who are often around him or her. Babies at this age [de-identified] show fear of strangers. At this age, your child may pull himself or herself up to standing. He or she may wave bye-bye or play pat-a-cake or peekaboo. Your child may point with fingers and try to feed himself or herself. It is common for a child at this age to be afraid of strangers. Follow-up care is a key part of your child's treatment and safety. Be sure to make and go to all appointments, and call your doctor if your child is having problems. It's also a good idea to know your child's test results and keep a list of the medicines your child takes. How can you care for your child at home? Feeding · Keep breastfeeding for at least 12 months to prevent colds and ear infections. · If you do not breastfeed, give your child a formula with iron. · Starting at 12 months, your child can begin to drink whole cow's milk or full-fat soy milk instead of formula. Whole milk provides fat calories that your child needs. If your child age 3 to 2 years has a family history of heart disease or obesity, reduced-fat (2%) soy or cow's milk may be okay. Ask your doctor what is best for your child. You can give your child nonfat or low-fat milk when he or she is 3years old. · Offer healthy foods each day, such as fruits, well-cooked vegetables, low-sugar cereal, yogurt, cheese, whole-grain breads, crackers, lean meat, fish, and tofu. It is okay if your child does not want to eat all of them. · Do not let your child eat while he or she is walking around. Make sure your child sits down to eat. Do not give your child foods that may cause choking, such as nuts, whole grapes, hard or sticky candy, or popcorn. · Let your baby decide how much to eat. · Offer water when your child is thirsty. Juice does not have the valuable fiber that whole fruit has. Do not give your baby soda pop, juice, fast food, or sweets. Healthy habits · Do not put your child to bed with a bottle. This can cause tooth decay. · Brush your child's teeth every day with water only. Ask your doctor or dentist when it's okay to use toothpaste. · Take your child out for walks. · Put a broad-spectrum sunscreen (SPF 30 or higher) on your child before he or she goes outside. Use a broad-brimmed hat to shade his or her ears, nose, and lips. · Shoes protect your child's feet. Be sure to have shoes that fit well. · Do not smoke or allow others to smoke around your child. Smoking around your child increases the child's risk for ear infections, asthma, colds, and pneumonia. If you need help quitting, talk to your doctor about stop-smoking programs and medicines. These can increase your chances of quitting for good. Immunizations Make sure that your baby gets all the recommended childhood vaccines, which help keep your baby healthy and prevent the spread of disease. Safety · Use a car seat for every ride. Install it properly in the back seat facing backward. For questions about car seats, call the [employer] at 3-824.383.3672. · Have safety smalls at the top and bottom of stairs. · Learn what to do if your child is choking. · Keep cords out of your child's reach. · Watch your child at all times when he or she is near water, including pools, hot tubs, and bathtubs. · Keep the number for Poison Control (7-147.629.4604) in or near your phone. · Tell your doctor if your child spends a lot of time in a house built before 1978. The paint may have lead in it, which can be harmful. Parenting · Read stories to your child every day. · Play games, talk, and sing to your child every day. Give him or her love and attention. · Teach good behavior by praising your child when he or she is being good. Use your body language, such as looking sad or taking your child out of danger, to let your child know you do not like his or her behavior. Do not yell or spank. When should you call for help? Watch closely for changes in your child's health, and be sure to contact your doctor if: 
  · You are concerned that your child is not growing or developing normally.  
  · You are worried about your child's behavior.  
  · You need more information about how to care for your child, or you have questions or concerns. Where can you learn more? Go to http://www.gray.com/ Enter G850 in the search box to learn more about \"Child's Well Visit, 9 to 10 Months: Care Instructions. \" Current as of: May 27, 2020               Content Version: 12.6 © 2601-0204 [employer]. Care instructions adapted under license by [employer] (which disclaims liability or warranty for this information). If you have questions about a medical condition or this instruction, always ask your healthcare professional. [address] any warranty or liability for your use of this information. (4) completely dependent

## 2021-03-18 NOTE — DISCHARGE NOTE ADULT - NS MD DC PLAN IMMU FLU REF OTH
Yessica received counseling on the following healthy behaviors: nutrition and exercise  Reviewed prior labs and health maintenance  Continue current medications, diet and exercise. Discussed use, benefit, and side effects of prescribed medications. Barriers to medication compliance addressed. Patient given educational materials - see patient instructions  Was a self-tracking handout given in paper form or via [employer]? Yes    Requested Prescriptions      No prescriptions requested or ordered in this encounter       All patient questions answered. Patient voiced understanding. Quality Measures    Body mass index is 37.59 kg/m². Elevated. Weight control plan discussed: Healthy diet and regular exercise. BP: (!) 138/90 Blood pressure is normal. Treatment plan: See main progress note. Lab Results   Component Value Date    LDLCHOLESTEROL 92 03/10/2020    (goal LDL reduction with dx if diabetes is 50% LDL reduction)      PHQ Scores 3/18/2021 3/12/2020 3/11/2019 9/11/2018 5/30/2018 1/5/2018 10/13/2016   PHQ2 Score 0 0 0 0 0 0 0   PHQ9 Score 0 0 0 0 0 0 0     See progress note for plan, if depression exists. Interpretation of Total Score: Major depression if the answer to questions 1 or 2 and 5 or more of questions 1 to 9 are at least [facility] than half the days. \"  Other depressive syndrome if questions 1 or 2 and two, three, or four of questions 1 to 9 are at least [facility] than half the days\"   Depression Severity: 5-9 = Mild depression, 10-14 = Moderate depression, 15-19 = Moderately severe depression, 20-27 = Severe depression Transfer to Acute Care Facility Not indicated for this patient

## 2021-06-05 NOTE — H&P ADULT. - LV FUNCTION ASSESSMENT
ED Attending Physician Note      Patient : Jessa Vivar Age: 89 year old Sex: female   MRN: [id number] Encounter Date: 6/5/2021      History         Chief Complaint   Patient presents with   • Cardiac Arrest     From home. Per family unresponsive for 20 mins, apneic for last 5 mins prior to EMS arrival. No pulse on EMS arrival. Pt received 4 epi with ROSC at 10:40. Arrested again at 10:52am while in ambulance, given 1 more epi prior to ED arrival.          HPI: 89 year old female presents with cardiac arrest.  Patient reportedly had a witnessed arrest at 1015.  EMS arrived patient was in asystole ACLS protocol was immediately initiated.  They did 5 rounds of epinephrine and then they had ROSC at 1040.  Well setting up to transport the patient.  They then had a loss of pulse again at 1052.  Patient is unable to provide any meaningful history is currently pulseless with ACLS protocol in place utilizing the Porfirio device.      No Known Allergies  Unable to assess    No past medical history on file.  Unable to assess    No past surgical history on file.  Appears to have had left mastectomy based on my exam    No family history on file.  Unable to assess    Social History     Tobacco Use   • Smoking status: Not on file   Substance Use Topics   • Alcohol use: Not on file   • Drug use: Not on file     Unable to assess  Review of Systems  Able to assess  Physical Exam     ED Triage Vitals   ED Triage Vitals Group      Temp --       Heart Rate 06/05/21 1104 (!) 147      Resp 06/05/21 1117 16      BP 06/05/21 1058 (!) 163/124      SpO2 06/05/21 1058 99 %      EtCO2 mmHg 06/05/21 1100 97 mmHg      Height --       Weight --       Weight Scale Used --       BMI (Calculated) --       IBW/kg (Calculated) --        Physical Exam  Pulmonary:      Comments: Patient has breath sounds although there is diffuse crepitus  Abdominal:      General: There is distension.   Neurological:      Comments: Unresponsive   Psychiatric:      Comments:  Unresponsive       Patient is unresponsive ET tube in place.  She has crepitus on the face and cheeks as well as on the right breast and left chest.  She has abdominal distention and crepitus to the abdomen.  And crepitus to the groin.  She has an IO in place in the right lower extremity.    ED Course     Procedures: Needle decompressions bilateral were performed in the ER for pneumothoraces emergent indications no complications.  The right side was done in the anterior axillary line at the fifth intercostal space with air return.  The left side was initially attempted at the left anterior intercostal space however there is difficulty getting any air released to and as such a second attempt needle decompression was performed with air return at the second intercostal space in the clavicular line.    Emergent bronchoscopy was performed to do suction and establish confirmation of ET tube.  There were no complications.    EKG: NONE    Critical Care Total time: greater than 35 minutes spent engaged in work directly related to patient care and/ or available for direct patient care, exclusive of procedure time and/or teaching time.   Critical condition(s) addressed for impending deterioration   Management: bedside assessment, Case review with admitting service and specialist as needed as well as with the resident if there was any teaching.  Performed by:Myself       Lab Results     No results found for this visit on 06/05/21.      Radiology Results     Imaging Results          XR CHEST PA OR AP 1 VIEW (Final result)  Result time 06/05/21 11:47:31    Final result                 Impression:       As above      Electronically Signed by: SARAH STEPHENSON M.D.   Signed on: 6/5/2021 11:47 AM                Narrative:    EXAM: XR CHEST PA OR AP 1 VIEW    CLINICAL INDICATION: Cardiac arrest    COMPARISON: None.    FINDINGS: Image quality is degraded by overlying device.  There is a  probable right lung infiltrate.  Left lung is  clear.  Heart size is normal.   Tip of tracheostomy tube lies near the right mainstem bronchus.  This  should be retracted 2 cm.    Enteric tube follows course of esophagus and stomach.  The tip is not  visualized.  Overlying monitoring devices are seen.                                Medical Decision Making        ED Course as of Jun 05 1222   Sat Jun 05, 2021   1146 Patient was asystole , there was no palpable pulse there was no spontaneous breathing or breath sounds.  All he could hear was crepitus on her chest.  As such the time of death was 1144    [TN]   1216 Discussed with dr blanchard of pt     [TN]      ED Course User Index  [TN] Hector Al MD       When patient came to the ER we restarted ACLS protocol.  We were able to regain a pulse.  We confirmed ET tube via bronchoscopy.  We did a bedside ultrasound to confirm no pneumothoraces.  And we confirmed cardiac activity.  Patient then started to go bradycardic while we were talking to the family.  We had already started Levophed.  And I ordered antibiotics.  She then went back into PEA.  I entered the room.  patient became difficult to bag.  Dr. Canales under my direct supervision to the left needle decompression this was technically difficult given the edema and mastectomy.  My medical student with my direct assistance was able to do a needle decompression on the right chest at the anterior axillary line.  These were both successful.  And the patient began to be easily bagged again. We then had family come to the bedside.  After patient continued to be in PEA even after needle decompressions, Levophed multiple rounds of ACLS medication of epinephrine as well as reattempted bicarb and calcium.  We discussed my concerns about poor neurologic outcome given the extent of downtime.  As well as the diffuse subcutaneous emphysema she has severe significant abdominal distention that was not relieved with the OG-tube.  I explained to the family that I thought this was  likely a poor outcome and they were in agreement and we stop CPR.  Patient almost immediately went into asystole before I even had a chance to leave the room and time of death was called above please see ED course for actual time of death on final exam    Clinical Impression     1. Cardiac arrest  2. Abdominal distention  3. Diffuse crepitus  Disposition         2021 11:46 AM  There is no comment    Hector Al MD   2021 12:22 PM     Hector Al MD  21 1221     unknown

## 2021-08-27 NOTE — ED ADULT NURSE NOTE - NS ED NURSE AMBULANCES2
Addended by: Nael Levine on: 8/27/2021 09:27 AM     Modules accepted: Orders [facility]

## 2022-01-04 NOTE — ED ADULT TRIAGE NOTE - ARRIVAL FROM
Appointment cancelled by patient. Rescheduled for 1/18/2022    Joseph Murillo Psy.D, LP   Behavioral Health Clinician   -[facility]     January 4, 2022    
Nursing home

## 2022-08-08 NOTE — H&P ADULT. - MS EXT PE MLT D E PC
Pt had lap juan jose 08.06.22.  Now in ER c/o RUQ pain, N/V, no fever, chills.  Elevated LFT's, nl urine, WBC 98441.  CT abd unremarkable.  Dr. Salgado thinks she needs to be transferred to [place].      Dr. Salgado will call Dr. Cade to discuss.  
pedal edema/no cyanosis/no clubbing

## 2023-06-02 NOTE — PATIENT PROFILE ADULT. - HEALTHCARE INFORMATION NEEDED, PROFILE
none

## 2023-09-27 NOTE — ED ADULT NURSE NOTE - NS PRO AD PATIENT TYPE
Please update immunization record.   
Pooja Doss, [facility] 9/26/2023 3:41 PM CDT      ----- Message -----  From: Marjorie Penny  Sent: 9/26/2023 3:23 PM CDT  To: *  Subject: Covid and Flu Vaccine     Hi Dr. Romano,  I just wanted to let you know I received the new Covid vaccine and Flu vaccine at my [facility] pharmacy in [place]. The pharmacist thought it would be good to get the shots in separate arms in case there was a reaction. I got the Covid vaccine in the left arm where I always got the Covid vaccine and the Flu vaccine in my right arm. Last night, both arms were sore at the injection site and I just took a Tylenol before bed. Today, Covid arm is a little tender at the injection site and very slight headache and I took Tylenol. Just thought you would want to have the office update my records.  Sincerely,  Marjorie Penny    
Do Not Resuscitate (DNR)

## 2024-04-02 NOTE — DIETITIAN INITIAL EVALUATION ADULT. - ORAL INTAKE PTA
Spoke with patient and her , Bruce, about the following, she is having fatigue and loss of appetite but otherwise feels well.  I went over the following instructions, they expressed understanding and thanked us for the call back:  Donaldo Baires MD  [facility] [place] [facility]  Please stop the spironolactone and losartan patient's creatinine slightly higher and potassium high  Modestly low potassium diet  Repeat a basic metabolic profile in about 4 to 5 days!    She also asked about having possible cataract surgery in a couple of months.  She was wondering if it would be okay for her to have it done since her labs have gotten worse.  I explained that we are going to work on getting her labs back down to her baseline and that we can make recommendations when the time comes for surgery so she wouldn't have to rule it out.  I told them we want to focus on getting her stable before we would want to clear her for surgery.  She expressed understanding and thanked us for the call.   n/a

## 2025-03-18 NOTE — DISCHARGE NOTE ADULT - MEDICATION SUMMARY - MEDICATIONS TO TAKE
36.5 I will START or STAY ON the medications listed below when I get home from the hospital:    predniSONE 10 mg oral tablet  -- 1 tab(s) by mouth once a day  -- Indication: For Acute on chronic respiratory failure, unspecified whether with hypoxia or hypercapnia    Tylenol  -- 650 milligram(s) by mouth every 6 hours, As Needed  -- for mild pain  -- Indication: For Pain    Tylenol  -- 650 milligram(s) by mouth every 6 hours, As Needed  -- for temp above 100.4  -- Indication: For Fever    digoxin 125 mcg (0.125 mg) oral tablet  -- 1 tab(s) by mouth once a day  -- Indication: For AFIB    diltiaZEM 120 mg/12 hours oral capsule, extended release  --  by mouth once a day  -- Indication: For AFIB    Coumadin  -- 1.5 milligram(s) by mouth once a day (at bedtime)  -- Indication: For AFIb    Neurontin 100 mg oral capsule  -- 2 cap(s) by mouth 2 times a day  -- Indication: For Neuropathy    allopurinol 100 mg oral tablet  --  by mouth once a day  -- Indication: For Gout    DuoNeb 0.5 mg-2.5 mg/3 mL inhalation solution  --  inhaled every 6 hours  -- Indication: For Acute on chronic respiratory failure, unspecified whether with hypoxia or hypercapnia    Lasix 40 mg oral tablet  -- 1 tab(s) by mouth once a day  -- Indication: For CHF    epoetin bacilio  -- 28674 unit(s) subcutaneous Monday, Wednesday, and Friday  -- Hold if Hgb >10.5  -- Indication: For Anemia    Siltussin 100 mg/5 mL oral liquid  -- 10 milliliter(s) by mouth every 6 hours  -- Indication: For Cough    Colace 100 mg oral capsule  --  by mouth once a day  -- Indication: For Constipation    potassium chloride 10 mEq oral capsule, extended release  -- 1 cap(s) by mouth 2 times a day  -- Indication: For Supplement    Protonix 40 mg oral delayed release tablet  -- 1 tab(s) by mouth once a day  -- Indication: For GERD    Synthroid 25 mcg (0.025 mg) oral tablet  -- 1 tab(s) by mouth once a day  -- Indication: For Hypothyroidism    multivitamin  -- 1 tab(s) by mouth once a day  -- Indication: For Supplement

## 2025-03-19 NOTE — PATIENT PROFILE ADULT. - PAIN SCALE PREFERRED, PROFILE
Clinical Pharmacy Appointment    Patient ID: Yane Lang is a 53 y.o. female who presents for Diabetes.    Pt is here for Follow Up appointment.     Referring Provider: Rj Nj DO  PCP: Rj Nj DO   Last visit with PCP: 1/21/2025  Next visit with PCP: not scheduled; encouraged     Patient Assistance for Mounjaro and Jardiance approved through 11/11/2025. Will have to be renewed prior to that date to prevent lapse in coverage. Medication(s) will be received at no cost to patient from [facility].       Subjective     HPI    DIABETES MELLITUS TYPE 2:    Diagnosed with diabetes:  8 years. Known diabetic complications: peripheral neuropathy.  Does patient follow with Endocrinology: no  Last optometry exam: none  Most recent visit in Podiatry: 2021-- patient denies sores or cuts on feet today      Current diabetic medications include:  Glimepiride 4mg twice a day  Jardiance 25mg daily  Actos 45mg daily  Mounjaro 10mg once weekly (Sunday)     Adverse Effects: diarrhea    Glucose Readings:  Glucometer/CGM Type: OneTouch  Current home BG readings: 90-160mg/dL    Any episodes of hypoglycemia? No, none reported .  Did patient treat episode of hypoglycemia appropriately? N/A  Does the patient have a prescription for ready-to-use Glucagon? N/a  Does pt have proteinuria? N/A, UACR of 54 5/2021    Secondary Prevention:  Statin? Yes- Lipitor 10mg take 1 tab every day  ACE-I/ARB? Yes- Lisinopril 10mg take 1 tab every day  Aspirin? No    Pertinent PMH Review:  PMH of Pancreatitis: No  PMH of Retinopathy: No  PMH of Urinary Tract Infections: No  PMH of MTC: No    Drug Interactions  No relevant drug interactions were noted.    Medication System Management  Patient's preferred pharmacy: [facility] [place]  Adherence/Organization: was getting medications through mail order, but states she has an outstanding balance there and cannot get any meds sent out to her; prefers [facility] in [place]  now  Affordability/Accessibility:  Patient Assistance      Objective   Allergies   Allergen Reactions    Cephalexin Itching     GI UPSET    Iodinated Contrast Media Unknown    Meperidine Unknown    Morphine Itching    Other Swelling    Oxycodone Hcl-Oxycodone-Asa Unknown    Oxycodone-Acetaminophen Unknown    Polymyxin B Other    Propoxyphene N-Acetaminophen Unknown     Social History     Social History Narrative    Not on file      Medication Review  Current Outpatient Medications   Medication Instructions    acetaminophen (Tylenol 8 HOUR) 650 mg ER tablet Take 1 tablet by mouth every 8 hours if needed for mild pain (1 - 3). Do not crush, chew, or split.    atorvastatin (LIPITOR) 10 mg, oral, Daily    Banophen 25 mg capsule TAKE 2 CAPSULES BY MOUTH EVERY 4 HOURS    blood sugar diagnostic (OneTouch Verio test strips) strip USE 1 STRIP 3 TIMES DAILY.    cholecalciferol (VITAMIN D-3) 50,000 Units, oral, Once Weekly    diclofenac (VOLTAREN) 75 mg, oral, 2 times daily PRN, Do not crush, chew, or split.    dicyclomine (Bentyl) 10 mg capsule TAKE 1 CAPSULE BY MOUTH FOUR TIMES DAILY    furosemide (LASIX) 20 mg, oral, Daily    gabapentin (NEURONTIN) 400 mg, oral, 4 times daily    glimepiride (AMARYL) 4 mg, oral, 2 times daily    ibuprofen 600 mg tablet TAKE 1 TABLET BY MOUTH THREE TIMES A DAY AS NEEDED FOR PAIN    Jardiance 25 mg, oral, Daily    lancets misc 3 times daily    lidocaine (Lidoderm) 5 % patch APPLY ONE PATCH TO THE AFFECTED AREA(S) DAILY. 12 HOURS on, 12 HOURS off.    lisinopril 10 mg, oral, Daily, for blood pressure    methocarbamol (Robaxin) 500 mg tablet TAKE 1 TO 2 TABLETS BY MOUTH NIGHTLY    Mounjaro 12.5 mg, subcutaneous, Weekly    nabumetone (RELAFEN) 500 mg, oral, 2 times daily    omeprazole (PRILOSEC) 20 mg, oral, Daily before breakfast    ondansetron ODT (ZOFRAN-ODT) 4 mg, oral, Every 6 hours PRN, DISSOLVE  ON THE TONGUE    pioglitazone (ACTOS) 45 mg, oral, Daily    tiZANidine (ZANAFLEX) 4 mg, oral,  2 times daily    traZODone (DESYREL) 50 mg, oral, Nightly      Vitals  BP Readings from Last 2 Encounters:   11/01/24 134/72   04/22/24 118/80     BMI Readings from Last 1 Encounters:   11/13/24 53.14 kg/m²      Labs  A1C  Lab Results   Component Value Date    HGBA1C 8.0 (A) 11/01/2024    HGBA1C 8.9 (H) 04/05/2024    HGBA1C 9.6 (A) 07/13/2023     BMP  Lab Results   Component Value Date    CALCIUM 9.4 04/05/2024     (L) 04/05/2024    K 4.6 04/05/2024    CO2 24 04/05/2024    CL 99 04/05/2024    BUN 18 04/05/2024    CREATININE 0.73 04/05/2024    EGFR >90 04/05/2024     LFTs  Lab Results   Component Value Date    ALT 27 04/05/2024    AST 21 04/05/2024    ALKPHOS 103 04/05/2024    BILITOT 0.6 04/05/2024     FLP  Lab Results   Component Value Date    TRIG 166 (H) 10/04/2023    CHOL 161 10/04/2023    LDLF 87 04/22/2022    LDLCALC 85 (L) 10/04/2023    HDL 43.2 10/04/2023     Urine Microalbumin  Lab Results   Component Value Date    MICROALBCREA 54.7 (H) 05/11/2021     Weight Management  Wt Readings from Last 3 Encounters:   11/13/24 136 kg (300 lb)   11/01/24 137 kg (301 lb)   04/22/24 132 kg (291 lb)      There is no height or weight on file to calculate BMI.     Assessment/Plan   Problem List Items Addressed This Visit       Type 2 diabetes mellitus without complications (Multi)     Patient's goal A1c is < 7%.  Is pt at goal? No, 8.0% (11/1/2024)  Patient's SMBGs are 90-160mg/dL, but limited  Rationale for plan: Patient tolerating Mounjaro well. Will increase dose to help with FBG.    Medication Changes:  CONTINUE:  Glimepiride 4mg 2 times a day  Jardiance 25mg daily  Actos 45mg daily  INCREASE  Mounjaro to 12.5mg once weekly. [facility]    Future Considerations:  Recommend lifestyle changes  Titrate Mounjaro as tolerated.    Diabetes Education  Rule of 15: eating ~15 g of carbs when BG less than 80 (half cup juice, 3-4 glucose tabs).  Recognize symptoms of high and  low blood sugar.   Eat a realistic healthy diet consisting of fruits, vegetables, fiber, protein food choices on a regular basis and be aware of portion/serving sizes. Reduce carbohydrate consumption and always consume with protein and fat. Avoid foods high in saturated/trans fat, high salt content, and sweets and beverages with added sugars.  Limit alcohol consumption; alcohol may affect your blood sugar and cause hypoglycemia.   Stay active and incorporate ~30 mins of exercise into your daily routine to manage your weight and increase the body's acceptance of insulin.    PATIENT GOALS   Fasting B - 130 mg/dL 2-HR Postprandial BG:   Less than 180 mg/dL A1c:   Less than 7.0 %      Mounjaro Education:   Counseled patient on MOA, expectations, side effects, duration of therapy, contraindications, administration, and monitoring parameters  Answered all patient questions and concerns  Provided detailed dosing and administration counseling to ensure proper technique.   Reviewed Mounjaro titration schedule, starting with 2.5 mg once weekly to a goal of 15 mg once weekly if tolerated  Counseled patient on the benefits of GLP-1ra glycemic control and weight loss  Reviewed storage requirements of Mounjaro when not in use, and when to administer the medication if a dose is missed.  Advised patient that they may experience improved satiety after meals and portion sizes of meals may be reduced as doses of Mounjaro increase.    Empagliflozin (Jardiance) Education:  Counseled patient on Empagliflozin (Jardiance) MOA, expectations, side effects, duration of therapy, administration, and monitoring parameters.  Reviewed the benefits of SGLT-2i therapy, such as glycemic control and kidney and CV protection.  Advised patient to practice proper  hygiene to reduce risk of UTIs or yeast infections.  Advised patient to maintain adequate fluid intake to remain hydrated while on SGLT2i therapy.  Answered all patient questions and  concerns.         Relevant Medications    tirzepatide (Mounjaro) 12.5 mg/0.5 mL pen injector    Other Relevant Orders    Referral to Clinical Pharmacy       Clinical Pharmacist follow-up: 4/16/2025 at 2:20PM, Telehealth visit    Continue all meds under the continuation of care with the referring provider and clinical pharmacy team.    Please reach out to the Clinical Pharmacy Team if there are any further questions.     Verbal consent to manage patient's drug therapy was obtained from patient. They were informed they may decline to participate or withdraw from participation in pharmacy services at any time.    Brinana Joseph, PharmD  [phone]   numerical 0-10

## 2025-05-16 NOTE — H&P ADULT. - DOES THIS PATIENT HAVE A HISTORY OF OR HAS BEEN DX WITH HEART FAILURE?
----- Message from Leonila MORROW MA sent at 5/16/2025 10:31 AM CDT -----  Regarding: bloodwork  Please reach out to pt to have labs done prior to MD visit. 5/22. Pt could have done day of if doesn't want to make separate trip, will not have all results.   no